# Patient Record
Sex: FEMALE | Race: WHITE | NOT HISPANIC OR LATINO | URBAN - METROPOLITAN AREA
[De-identification: names, ages, dates, MRNs, and addresses within clinical notes are randomized per-mention and may not be internally consistent; named-entity substitution may affect disease eponyms.]

---

## 2023-02-24 LAB
URINE CULTURE: ABNORMAL
URINE CULTURE: ABNORMAL

## 2023-03-01 PROBLEM — I10 HYPERTENSION: Status: ACTIVE | Noted: 2023-03-01

## 2023-03-01 PROBLEM — H16.229 KERATOCONJUNCTIVITIS SICCA NOT DUE TO SJOGREN'S SYNDROME: Status: ACTIVE | Noted: 2023-03-01

## 2023-03-01 PROBLEM — M48.00 SPINAL STENOSIS: Status: ACTIVE | Noted: 2023-03-01

## 2023-03-01 PROBLEM — R53.1 WEAKNESS: Status: ACTIVE | Noted: 2023-03-01

## 2023-03-01 PROBLEM — R32 URINARY INCONTINENCE: Status: ACTIVE | Noted: 2023-03-01

## 2023-03-01 PROBLEM — M79.605 PAIN OF LEFT LOWER EXTREMITY: Status: ACTIVE | Noted: 2023-03-01

## 2023-03-01 PROBLEM — J18.9 PNEUMONIA: Status: ACTIVE | Noted: 2023-03-01

## 2023-03-01 PROBLEM — Z98.890 S/P YAG CAPSULOTOMY, RIGHT: Status: ACTIVE | Noted: 2023-03-01

## 2023-03-01 PROBLEM — R09.81 SINUS CONGESTION: Status: ACTIVE | Noted: 2023-03-01

## 2023-03-01 PROBLEM — D50.9 IRON DEFICIENCY ANEMIA: Status: ACTIVE | Noted: 2023-03-01

## 2023-03-01 PROBLEM — B96.89 ACUTE BACTERIAL SINUSITIS: Status: ACTIVE | Noted: 2023-03-01

## 2023-03-01 PROBLEM — N39.46 MIXED STRESS AND URGE URINARY INCONTINENCE: Status: ACTIVE | Noted: 2023-03-01

## 2023-03-01 PROBLEM — Z98.42 HISTORY OF YAG LASER CAPSULOTOMY OF LENS OF LEFT EYE: Status: ACTIVE | Noted: 2023-03-01

## 2023-03-01 PROBLEM — H52.4 MYOPIA WITH PRESBYOPIA: Status: ACTIVE | Noted: 2023-03-01

## 2023-03-01 PROBLEM — H57.813 BROW PTOSIS, BILATERAL: Status: ACTIVE | Noted: 2023-03-01

## 2023-03-01 PROBLEM — H53.19: Status: ACTIVE | Noted: 2023-03-01

## 2023-03-01 PROBLEM — M54.50 LOWER BACK PAIN: Status: ACTIVE | Noted: 2023-03-01
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
## 2023-03-01 PROBLEM — H26.493 PCO (POSTERIOR CAPSULAR OPACIFICATION), BILATERAL: Status: ACTIVE | Noted: 2023-03-01

## 2023-03-01 PROBLEM — Q67.5 CONGENITAL SCOLIOSIS: Status: ACTIVE | Noted: 2023-03-01

## 2023-03-01 PROBLEM — K58.9 IRRITABLE BOWEL SYNDROME: Status: ACTIVE | Noted: 2023-03-01

## 2023-03-01 PROBLEM — J30.9 ALLERGIC RHINITIS: Status: ACTIVE | Noted: 2023-03-01

## 2023-03-01 PROBLEM — M81.0 OSTEOPOROSIS: Status: ACTIVE | Noted: 2023-03-01

## 2023-03-01 PROBLEM — Z96.1 PSEUDOPHAKIA OF BOTH EYES: Status: ACTIVE | Noted: 2023-03-01

## 2023-03-01 PROBLEM — M25.579 ANKLE PAIN: Status: ACTIVE | Noted: 2023-03-01

## 2023-03-01 PROBLEM — S90.30XA: Status: ACTIVE | Noted: 2023-03-01

## 2023-03-01 PROBLEM — M54.6 THORACIC SPINE PAIN: Status: ACTIVE | Noted: 2023-03-01

## 2023-03-01 PROBLEM — H61.23 BILATERAL IMPACTED CERUMEN: Status: ACTIVE | Noted: 2023-03-01

## 2023-03-01 PROBLEM — M77.8 TENDINITIS OF RIGHT SHOULDER: Status: ACTIVE | Noted: 2023-03-01

## 2023-03-01 PROBLEM — H52.10 MYOPIA WITH PRESBYOPIA: Status: ACTIVE | Noted: 2023-03-01

## 2023-03-01 PROBLEM — H02.839 DERMATOCHALASIS OF EYELID: Status: ACTIVE | Noted: 2023-03-01

## 2023-03-01 PROBLEM — M19.049 ARTHRITIS OF HAND: Status: ACTIVE | Noted: 2023-03-01

## 2023-03-01 PROBLEM — N81.10 FEMALE CYSTOCELE: Status: ACTIVE | Noted: 2023-03-01

## 2023-03-01 PROBLEM — J01.90 ACUTE BACTERIAL SINUSITIS: Status: ACTIVE | Noted: 2023-03-01

## 2023-03-01 PROBLEM — R39.9 URINARY SYMPTOM OR SIGN: Status: ACTIVE | Noted: 2023-03-01

## 2023-03-01 PROBLEM — F41.9 ANXIETY: Status: ACTIVE | Noted: 2023-03-01

## 2023-03-01 PROBLEM — H61.20 CERUMEN IMPACTION: Status: ACTIVE | Noted: 2023-03-01

## 2023-03-01 PROBLEM — E55.9 VITAMIN D DEFICIENCY: Status: ACTIVE | Noted: 2023-03-01

## 2023-03-01 PROBLEM — M51.26 LUMBAR HERNIATED DISC: Status: ACTIVE | Noted: 2023-03-01

## 2023-03-01 PROBLEM — M25.511 RIGHT SHOULDER PAIN: Status: ACTIVE | Noted: 2023-03-01

## 2023-03-01 PROBLEM — N31.2 ATONIC BLADDER: Status: ACTIVE | Noted: 2023-03-01

## 2023-03-01 PROBLEM — H02.402 PTOSIS OF EYELID, LEFT: Status: ACTIVE | Noted: 2023-03-01

## 2023-03-01 PROBLEM — H01.029 SQUAMOUS BLEPHARITIS: Status: ACTIVE | Noted: 2023-03-01

## 2023-03-01 PROBLEM — H02.401 PTOSIS OF RIGHT EYELID: Status: ACTIVE | Noted: 2023-03-01

## 2023-03-01 PROBLEM — D64.9 ANEMIA: Status: ACTIVE | Noted: 2023-03-01

## 2023-03-01 PROBLEM — E78.5 HYPERLIPIDEMIA: Status: ACTIVE | Noted: 2023-03-01

## 2023-03-01 PROBLEM — M25.512 LEFT SHOULDER PAIN: Status: ACTIVE | Noted: 2023-03-01

## 2023-03-01 PROBLEM — S80.12XA CONTUSION OF LEFT LEG: Status: ACTIVE | Noted: 2023-03-01

## 2023-03-01 PROBLEM — H52.209 ASTIGMATISM: Status: ACTIVE | Noted: 2023-03-01

## 2023-03-01 PROBLEM — M41.9 SCOLIOSIS: Status: ACTIVE | Noted: 2023-03-01

## 2023-03-01 PROBLEM — M85.80 OSTEOPENIA: Status: ACTIVE | Noted: 2023-03-01

## 2023-03-01 PROBLEM — R33.9 RETENTION OF URINE: Status: ACTIVE | Noted: 2023-03-01

## 2023-03-01 PROBLEM — H26.9 CATARACT: Status: ACTIVE | Noted: 2023-03-01

## 2023-03-01 PROBLEM — H25.813 COMBINED FORM OF AGE-RELATED CATARACT, BOTH EYES: Status: ACTIVE | Noted: 2023-03-01

## 2023-03-01 RX ORDER — GABAPENTIN 600 MG/1
600 TABLET ORAL 3 TIMES DAILY
COMMUNITY
Start: 2015-01-31 | End: 2023-05-15 | Stop reason: SDUPTHER

## 2023-03-01 RX ORDER — DENOSUMAB 60 MG/ML
INJECTION SUBCUTANEOUS
COMMUNITY
Start: 2019-10-07 | End: 2023-03-23 | Stop reason: SDUPTHER

## 2023-03-01 RX ORDER — NORTRIPTYLINE HYDROCHLORIDE 25 MG/1
2 CAPSULE ORAL EVERY EVENING
COMMUNITY
Start: 2013-07-20 | End: 2023-05-03 | Stop reason: SDUPTHER

## 2023-03-01 RX ORDER — DOCUSATE SODIUM 100 MG/1
1 CAPSULE, LIQUID FILLED ORAL 2 TIMES DAILY PRN
COMMUNITY
End: 2023-05-15 | Stop reason: ALTCHOICE

## 2023-03-01 RX ORDER — POLYETHYLENE GLYCOL 3350 17 G/17G
POWDER, FOR SOLUTION ORAL
COMMUNITY
End: 2023-05-15 | Stop reason: WASHOUT

## 2023-03-01 RX ORDER — PSEUDOEPHEDRINE HCL 120 MG/1
TABLET, FILM COATED, EXTENDED RELEASE ORAL
COMMUNITY
End: 2023-10-02 | Stop reason: ALTCHOICE

## 2023-03-01 RX ORDER — PANTOPRAZOLE SODIUM 40 MG/1
40 TABLET, DELAYED RELEASE ORAL
COMMUNITY
End: 2023-04-05

## 2023-03-01 RX ORDER — LISINOPRIL 10 MG/1
10 TABLET ORAL DAILY
COMMUNITY
End: 2023-04-03 | Stop reason: SDUPTHER

## 2023-03-01 RX ORDER — MINERAL OIL
1 ENEMA (ML) RECTAL DAILY PRN
COMMUNITY
Start: 2022-08-29 | End: 2024-01-08 | Stop reason: ALTCHOICE

## 2023-03-08 ENCOUNTER — OFFICE VISIT (OUTPATIENT)
Dept: PRIMARY CARE | Facility: CLINIC | Age: 78
End: 2023-03-08
Payer: MEDICARE

## 2023-03-08 VITALS
SYSTOLIC BLOOD PRESSURE: 136 MMHG | BODY MASS INDEX: 23.37 KG/M2 | HEART RATE: 67 BPM | DIASTOLIC BLOOD PRESSURE: 70 MMHG | TEMPERATURE: 97 F | OXYGEN SATURATION: 100 % | WEIGHT: 108 LBS

## 2023-03-08 DIAGNOSIS — A41.9 RECURRENT SEPSIS DUE TO URINARY TRACT INFECTION (MULTI): Primary | ICD-10-CM

## 2023-03-08 DIAGNOSIS — I95.9 HYPOTENSION, UNSPECIFIED HYPOTENSION TYPE: ICD-10-CM

## 2023-03-08 DIAGNOSIS — N39.0 RECURRENT SEPSIS DUE TO URINARY TRACT INFECTION (MULTI): Primary | ICD-10-CM

## 2023-03-08 PROCEDURE — 1159F MED LIST DOCD IN RCRD: CPT | Performed by: FAMILY MEDICINE

## 2023-03-08 PROCEDURE — 99214 OFFICE O/P EST MOD 30 MIN: CPT | Performed by: FAMILY MEDICINE

## 2023-03-08 PROCEDURE — 3075F SYST BP GE 130 - 139MM HG: CPT | Performed by: FAMILY MEDICINE

## 2023-03-08 PROCEDURE — 3078F DIAST BP <80 MM HG: CPT | Performed by: FAMILY MEDICINE

## 2023-03-08 ASSESSMENT — ENCOUNTER SYMPTOMS
FATIGUE: 1
DIFFICULTY URINATING: 1

## 2023-03-08 NOTE — PROGRESS NOTES
Subjective   Patient ID: Rahel Cabezas is a 77 y.o. female who presents for Follow-up (2 WEEK FUV).    HPI   She was in [facility] on 03/01/2023 due to sudden feeling unwell. Her legs felt heavy. She went home, sat down and almost passed out. She called the ambulance due to being home alone. She was examined at [facility]. She had an X ray of chest and pelvic. She was admitted ot the hospital due to low blood pressure and sepsis.     She had an UTI on 02/26/2023 and was treated with Macrobid. It did not clear.  She was in the hospital for 4 days.  She was sent home with Cipro 250 mg for 4 days. She was also sent home with Metronidazole 3 times a day for 4 days. She was taken off her blood pressure medication.     She went to Dr Og yesterday. She was told to stay active. She was told to use Miralax if she has constipation. She was told to take Metamucil or fiber daily.  She reports that she has had some loose stool and leakage. She has to change her pants frequently.       Review of Systems   Constitutional:  Positive for fatigue.   Genitourinary:  Positive for difficulty urinating.        Caths twice daily   All other systems reviewed and are negative.      Objective   /70 (BP Location: Right arm, Patient Position: Sitting)   Pulse 67   Temp 36.1 °C (97 °F)   Wt 49 kg (108 lb)   SpO2 100%   BMI 23.37 kg/m²     Physical Exam  Constitutional:       Appearance: Normal appearance.      Comments: Thin appearing   Cardiovascular:      Rate and Rhythm: Normal rate and regular rhythm.      Pulses: Normal pulses.      Heart sounds: Normal heart sounds.   Pulmonary:      Effort: Pulmonary effort is normal.      Breath sounds: Normal breath sounds.   Abdominal:      General: Abdomen is flat. Bowel sounds are normal.      Palpations: Abdomen is soft.   Musculoskeletal:         General: No tenderness. Normal range of motion.      Cervical back: Normal range of motion and neck supple.   Skin:      General: Skin is warm and dry.   Neurological:      General: No focal deficit present.      Mental Status: She is alert and oriented to person, place, and time.   Psychiatric:         Mood and Affect: Mood normal.         Thought Content: Thought content normal.         Judgment: Judgment normal.         Assessment/Plan   Loose Stool  Advised to slowly incorporate Metamucil.  Discussed may be due to antibiotics.     Health maintenance  Will be due for Colonoscopy in 2024    Hospital Follow up  Follow up in 7-10 days to recheck urine and blood pressure.     Scribe Attestation  By signing my name below, IEarline Scribe   attest that this documentation has been prepared under the direction and in the presence of Yu Conley DO.

## 2023-03-22 DIAGNOSIS — M81.0 AGE-RELATED OSTEOPOROSIS WITHOUT CURRENT PATHOLOGICAL FRACTURE: Primary | ICD-10-CM

## 2023-03-23 ENCOUNTER — OFFICE VISIT (OUTPATIENT)
Dept: PRIMARY CARE | Facility: CLINIC | Age: 78
End: 2023-03-23
Payer: MEDICARE

## 2023-03-23 VITALS
TEMPERATURE: 97.7 F | WEIGHT: 110 LBS | DIASTOLIC BLOOD PRESSURE: 87 MMHG | BODY MASS INDEX: 23.8 KG/M2 | OXYGEN SATURATION: 92 % | SYSTOLIC BLOOD PRESSURE: 129 MMHG

## 2023-03-23 DIAGNOSIS — N39.0 RECURRENT SEPSIS DUE TO URINARY TRACT INFECTION (MULTI): Primary | ICD-10-CM

## 2023-03-23 DIAGNOSIS — A41.9 RECURRENT SEPSIS DUE TO URINARY TRACT INFECTION (MULTI): Primary | ICD-10-CM

## 2023-03-23 DIAGNOSIS — M81.0 AGE-RELATED OSTEOPOROSIS WITHOUT CURRENT PATHOLOGICAL FRACTURE: ICD-10-CM

## 2023-03-23 DIAGNOSIS — M48.07 SPINAL STENOSIS OF LUMBOSACRAL REGION: ICD-10-CM

## 2023-03-23 DIAGNOSIS — I10 PRIMARY HYPERTENSION: ICD-10-CM

## 2023-03-23 LAB
POC APPEARANCE, URINE: CLEAR
POC BILIRUBIN, URINE: NEGATIVE
POC BLOOD, URINE: NEGATIVE
POC COLOR, URINE: YELLOW
POC GLUCOSE, URINE: NEGATIVE MG/DL
POC KETONES, URINE: NEGATIVE MG/DL
POC LEUKOCYTES, URINE: NEGATIVE
POC NITRITE,URINE: NEGATIVE
POC PH, URINE: 5.5 PH
POC PROTEIN, URINE: NEGATIVE MG/DL
POC SPECIFIC GRAVITY, URINE: 1.01
POC UROBILINOGEN, URINE: 0.2 EU/DL

## 2023-03-23 PROCEDURE — 99214 OFFICE O/P EST MOD 30 MIN: CPT | Performed by: FAMILY MEDICINE

## 2023-03-23 PROCEDURE — 3074F SYST BP LT 130 MM HG: CPT | Performed by: FAMILY MEDICINE

## 2023-03-23 PROCEDURE — 81001 URINALYSIS AUTO W/SCOPE: CPT

## 2023-03-23 PROCEDURE — 3079F DIAST BP 80-89 MM HG: CPT | Performed by: FAMILY MEDICINE

## 2023-03-23 PROCEDURE — 81003 URINALYSIS AUTO W/O SCOPE: CPT | Performed by: FAMILY MEDICINE

## 2023-03-23 PROCEDURE — 1159F MED LIST DOCD IN RCRD: CPT | Performed by: FAMILY MEDICINE

## 2023-03-23 PROCEDURE — 1160F RVW MEDS BY RX/DR IN RCRD: CPT | Performed by: FAMILY MEDICINE

## 2023-03-23 RX ORDER — DENOSUMAB 60 MG/ML
60 INJECTION SUBCUTANEOUS ONCE
Qty: 1 ML | Refills: 0 | Status: SHIPPED | OUTPATIENT
Start: 2023-03-23 | End: 2023-08-17 | Stop reason: SDUPTHER

## 2023-03-23 RX ORDER — DENOSUMAB 60 MG/ML
60 INJECTION SUBCUTANEOUS ONCE
Qty: 1 ML | Refills: 0 | Status: SHIPPED | OUTPATIENT
Start: 2023-03-23 | End: 2023-03-23 | Stop reason: SDUPTHER

## 2023-03-23 ASSESSMENT — ENCOUNTER SYMPTOMS
PSYCHIATRIC NEGATIVE: 1
CONSTITUTIONAL NEGATIVE: 1
BACK PAIN: 1
RESPIRATORY NEGATIVE: 1
CARDIOVASCULAR NEGATIVE: 1

## 2023-03-23 NOTE — PATIENT INSTRUCTIONS
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  Will send for culture. Will call for results.        Musculoskeletal    Osteoporosis    Relevant Medications    denosumab (Prolia) 60 mg/mL syringe,  Prescription sent to pharmacy.      Circulatory   Hypertension  Advised to check blood pressure again in 2-3 weeks. If it is more elevated, call the office.      Left Leg Pain  Advised to Heat, Stretch, Followed by Ice.   If Pain persists, we can order a steroid dose pack.

## 2023-03-23 NOTE — PROGRESS NOTES
Subjective   Patient ID: Rahel Cabezas is a 77 y.o. female who presents for Follow-up (Here for a follow up).    HPI   She complains of pain in the left buttocks down the thigh, on the side of the leg past the knee. This started last week after bendin over. She has been using heat and either Tylenol or ibuprofen for relief.  The pain is getting better, but staying the better if she uses heat and medication. It hurts when she walks as well at times. She notes the pain is no longer spreading to below the knee.     She has had difficulties getting her prolia filled. It has gone through and will be shipped to office for administration    She went to podiatry. She was treated for an ingrown toenail and fungal infection. She goes every 9 weeks for maintenance.     She is not currently taking lisinopril. She does not have a working blood pressure monitor at home. Bp is reasonable today, will continue observing    No furhter urinary s/s.  Will recheck urine today and culture      Review of Systems   Constitutional: Negative.    Respiratory: Negative.     Cardiovascular: Negative.    Genitourinary: Negative.    Musculoskeletal:  Positive for back pain.        Pain radiating down L leg   Psychiatric/Behavioral: Negative.         Objective   /87   Temp 36.5 °C (97.7 °F)   Wt 49.9 kg (110 lb)   SpO2 92%   BMI 23.80 kg/m²     Physical Exam  Constitutional:       Appearance: Normal appearance.   Cardiovascular:      Rate and Rhythm: Normal rate and regular rhythm.      Pulses: Normal pulses.      Heart sounds: Normal heart sounds.   Pulmonary:      Effort: Pulmonary effort is normal.      Breath sounds: Normal breath sounds.   Abdominal:      General: Bowel sounds are normal.      Palpations: Abdomen is soft.   Musculoskeletal:         General: No tenderness. Normal range of motion.      Cervical back: Normal range of motion and neck supple.      Comments: Pt has normal rom LS spine, she has tenderness L sacral sulcus,  no tenderness over greater trochanter.  + tenderness L buttocks    Dtr's wnl, no weakness or sensory or vasc deficit   Skin:     General: Skin is warm and dry.   Neurological:      General: No focal deficit present.      Mental Status: She is alert and oriented to person, place, and time.   Psychiatric:         Mood and Affect: Mood normal.         Thought Content: Thought content normal.         Judgment: Judgment normal.         Assessment/Plan   Problem List Items Addressed This Visit          Nervous    Spinal stenosis - Primary       Circulatory    Hypertension  Advised to check blood pressure again in 2-3 weeks. If it is more elevated, call the office.        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  Will send for culture. Will call for results.        Musculoskeletal    Osteoporosis    Relevant Medications    denosumab (Prolia) 60 mg/mL syringe,  Prescription sent to pharmacy.      Left Leg Pain  Advised to Heat, Stretch, Followed by Ice.   If Pain persists, we can order a steroid dose pack.     Follow up visit in 2 -3 months.      Scribe Attestation  By signing my name below, IEarline Scribe   attest that this documentation has been prepared under the direction and in the presence of Yu Conley DO.

## 2023-03-24 ENCOUNTER — TELEPHONE (OUTPATIENT)
Dept: PRIMARY CARE | Facility: CLINIC | Age: 78
End: 2023-03-24
Payer: MEDICARE

## 2023-03-24 DIAGNOSIS — M48.07 SPINAL STENOSIS OF LUMBOSACRAL REGION: Primary | ICD-10-CM

## 2023-03-24 LAB
RBC, URINE: 1 /HPF (ref 0–5)
WBC CLUMPS, URINE: ABNORMAL /HPF
WBC, URINE: 7 /HPF (ref 0–5)

## 2023-03-24 RX ORDER — METHYLPREDNISOLONE 4 MG/1
TABLET ORAL
Qty: 21 TABLET | Refills: 0 | Status: SHIPPED | OUTPATIENT
Start: 2023-03-24 | End: 2023-03-31

## 2023-03-24 NOTE — TELEPHONE ENCOUNTER
Rahel called and would like you to fill the pain meds you talked about for her leg yesterday she thought she could wait till Monday but she can not    [facility] [place]    Sent in medrol dose pack as discussed, AB

## 2023-04-03 ENCOUNTER — OFFICE VISIT (OUTPATIENT)
Dept: PRIMARY CARE | Facility: CLINIC | Age: 78
End: 2023-04-03
Payer: MEDICARE

## 2023-04-03 VITALS
TEMPERATURE: 97.7 F | HEART RATE: 80 BPM | OXYGEN SATURATION: 97 % | SYSTOLIC BLOOD PRESSURE: 143 MMHG | DIASTOLIC BLOOD PRESSURE: 83 MMHG

## 2023-04-03 DIAGNOSIS — M81.8 OTHER OSTEOPOROSIS, UNSPECIFIED PATHOLOGICAL FRACTURE PRESENCE: Primary | ICD-10-CM

## 2023-04-03 DIAGNOSIS — N39.0 RECURRENT SEPSIS DUE TO URINARY TRACT INFECTION (MULTI): ICD-10-CM

## 2023-04-03 DIAGNOSIS — I10 PRIMARY HYPERTENSION: ICD-10-CM

## 2023-04-03 DIAGNOSIS — A41.9 RECURRENT SEPSIS DUE TO URINARY TRACT INFECTION (MULTI): ICD-10-CM

## 2023-04-03 PROCEDURE — 3077F SYST BP >= 140 MM HG: CPT | Performed by: FAMILY MEDICINE

## 2023-04-03 PROCEDURE — 3079F DIAST BP 80-89 MM HG: CPT | Performed by: FAMILY MEDICINE

## 2023-04-03 PROCEDURE — 99214 OFFICE O/P EST MOD 30 MIN: CPT | Performed by: FAMILY MEDICINE

## 2023-04-03 PROCEDURE — 1159F MED LIST DOCD IN RCRD: CPT | Performed by: FAMILY MEDICINE

## 2023-04-03 PROCEDURE — 1160F RVW MEDS BY RX/DR IN RCRD: CPT | Performed by: FAMILY MEDICINE

## 2023-04-03 PROCEDURE — 96372 THER/PROPH/DIAG INJ SC/IM: CPT | Performed by: FAMILY MEDICINE

## 2023-04-03 RX ORDER — LISINOPRIL 5 MG/1
5 TABLET ORAL DAILY
Qty: 90 TABLET | Refills: 0 | Status: SHIPPED | OUTPATIENT
Start: 2023-04-03 | End: 2023-05-15 | Stop reason: SDUPTHER

## 2023-04-03 ASSESSMENT — ENCOUNTER SYMPTOMS
LOSS OF SENSATION IN FEET: 0
OCCASIONAL FEELINGS OF UNSTEADINESS: 0
DEPRESSION: 0

## 2023-04-03 NOTE — PATIENT INSTRUCTIONS
Shoulder pain  Advised to call to reschedule physical therapy for the shoulder prior to surgery.     Leg Pain  Patient will call if she is open to physical therapy.     Hypertention  Start  on Lisinopril 5 mg daily. Prescription sent to pharmacy.   Advised to get an electronic blood pressure cuff that fits the upper arm.   If it is consistently over 140/90, call the office for medication adjustment.

## 2023-04-03 NOTE — PROGRESS NOTES
Subjective   Patient ID: Rahel Cabezas is a 77 y.o. female who presents for Follow-up (Here for a fuv).    HPI   Patient originally came in for her Prolia injection which she did receive today.  She also wanted to be seen for her blood pressure and discuss her low back and her leg    She attributes elevated blood pressure to a miscommunication regarding todays visit. She has not been taking her lisinopril.  She wanted to discuss if it should be restarted since pressure is higher.  Will start half dose of the lisinopril 10 mg that she has at home, restarting 5 mg and will recheck.    She fell walking in the hallway at home after spraying for ants.  She hurt her right leg and her gluteal muscle.  She then started the prednisone for back pain.  Her back pain that she had previously and the pain from her fall has been improving with the Medrol Dosepak.  She has been using hot compresses and alternating with ice. She declines physical therapy.  She believes she can exercise on her own.     She canceled physical therapy for her arm, because she thought she was going to have it at home. However, there was a miscommunication.  She is going to reschedule that appointment and start the PT prescribed by her orthopedic doctor.    Review of Systems    Objective   /83   Pulse 80   Temp 36.5 °C (97.7 °F)   SpO2 97%     Physical Exam  Constitutional:       Appearance: Normal appearance.   Cardiovascular:      Rate and Rhythm: Normal rate and regular rhythm.      Pulses: Normal pulses.      Heart sounds: Normal heart sounds.   Pulmonary:      Effort: Pulmonary effort is normal.      Breath sounds: Normal breath sounds.   Musculoskeletal:         General: No tenderness. Normal range of motion.      Cervical back: Normal range of motion and neck supple.      Comments: No tenderness over spine, full rom, has scoliosis.  Good rom of bilat hits, no swelling or bruising of legs   Skin:     General: Skin is warm and dry.    Neurological:      General: No focal deficit present.      Mental Status: She is alert and oriented to person, place, and time.   Psychiatric:         Mood and Affect: Mood normal.         Thought Content: Thought content normal.         Judgment: Judgment normal.         Assessment/Plan   Problem List Items Addressed This Visit          Circulatory    Hypertension - Primary  Start on Lisinopril 5 mg daily. Prescription sent to pharmacy.   Advised to get an electronic blood pressure cuff that fits the upper arm.   If it is consistently over 140/90, call the office for medication adjustment.        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     Leg Pain  Patient will call if she is open to physical therapy. She is doing exercises at home from previous therapyand is much better w this and medrol dose pack    Follow up in 6-8 weeks, unless a visit is needed sooner.     Scribe Attestation  By signing my name below, IEarline Scribe   attest that this documentation has been prepared under the direction and in the presence of Yu Conley DO.

## 2023-05-03 DIAGNOSIS — K58.9 IRRITABLE BOWEL SYNDROME, UNSPECIFIED TYPE: Primary | ICD-10-CM

## 2023-05-03 RX ORDER — NORTRIPTYLINE HYDROCHLORIDE 25 MG/1
50 CAPSULE ORAL EVERY EVENING
Qty: 60 CAPSULE | Refills: 0 | Status: SHIPPED | OUTPATIENT
Start: 2023-05-03 | End: 2023-05-03 | Stop reason: SDUPTHER

## 2023-05-03 RX ORDER — NORTRIPTYLINE HYDROCHLORIDE 25 MG/1
50 CAPSULE ORAL EVERY EVENING
Qty: 180 CAPSULE | Refills: 0 | Status: SHIPPED | OUTPATIENT
Start: 2023-05-03 | End: 2023-08-17 | Stop reason: SDUPTHER

## 2023-05-03 NOTE — TELEPHONE ENCOUNTER
Patient called for refills of Metoprolol 25 mg called into [facility] in [place].  Last OV 4/3/2023, next OV 5/15/2023.

## 2023-05-12 PROBLEM — K21.9 GASTRO-ESOPHAGEAL REFLUX DISEASE WITHOUT ESOPHAGITIS: Status: ACTIVE | Noted: 2022-09-21

## 2023-05-12 PROBLEM — G62.9 POLYNEUROPATHY, UNSPECIFIED: Status: ACTIVE | Noted: 2022-09-21

## 2023-05-12 PROBLEM — A41.9 SEPTIC SHOCK (MULTI): Status: ACTIVE | Noted: 2023-03-01

## 2023-05-12 PROBLEM — F33.9 MAJOR DEPRESSIVE DISORDER, RECURRENT, UNSPECIFIED (CMS-HCC): Status: ACTIVE | Noted: 2022-09-21

## 2023-05-12 PROBLEM — M62.81 MUSCLE WEAKNESS (GENERALIZED): Status: ACTIVE | Noted: 2022-09-21

## 2023-05-12 PROBLEM — K27.9 PUD (PEPTIC ULCER DISEASE): Status: ACTIVE | Noted: 2023-05-12

## 2023-05-12 PROBLEM — R00.0 TACHYCARDIA, UNSPECIFIED: Status: ACTIVE | Noted: 2022-09-21

## 2023-05-12 PROBLEM — Z93.1 GASTROSTOMY STATUS (MULTI): Status: ACTIVE | Noted: 2022-09-21

## 2023-05-12 PROBLEM — K44.9 DIAPHRAGMATIC HERNIA WITHOUT OBSTRUCTION OR GANGRENE: Status: ACTIVE | Noted: 2022-09-21

## 2023-05-12 PROBLEM — Z86.718 PERSONAL HISTORY OF OTHER VENOUS THROMBOSIS AND EMBOLISM: Status: ACTIVE | Noted: 2022-09-21

## 2023-05-12 RX ORDER — FERROUS SULFATE 325(65) MG
65 TABLET ORAL
COMMUNITY

## 2023-05-12 RX ORDER — ACETAMINOPHEN 325 MG/1
325 TABLET ORAL EVERY 4 HOURS PRN
COMMUNITY
Start: 2022-09-20

## 2023-05-12 RX ORDER — AZELASTINE 1 MG/ML
1 SPRAY, METERED NASAL 2 TIMES DAILY
COMMUNITY
Start: 2022-05-26 | End: 2023-08-17 | Stop reason: ALTCHOICE

## 2023-05-12 RX ORDER — KETOCONAZOLE 20 MG/ML
SHAMPOO, SUSPENSION TOPICAL 2 TIMES WEEKLY
COMMUNITY
Start: 2019-07-31 | End: 2023-08-17 | Stop reason: ALTCHOICE

## 2023-05-12 RX ORDER — DICYCLOMINE HYDROCHLORIDE 10 MG/1
10 CAPSULE ORAL 2 TIMES DAILY
COMMUNITY
End: 2024-01-08 | Stop reason: WASHOUT

## 2023-05-12 RX ORDER — METRONIDAZOLE 500 MG/1
500 TABLET ORAL 3 TIMES DAILY
COMMUNITY
Start: 2023-03-05 | End: 2023-05-15 | Stop reason: WASHOUT

## 2023-05-12 RX ORDER — APIXABAN 5 MG/1
5 TABLET, FILM COATED ORAL 2 TIMES DAILY
COMMUNITY
End: 2023-05-15 | Stop reason: ALTCHOICE

## 2023-05-15 ENCOUNTER — OFFICE VISIT (OUTPATIENT)
Dept: PRIMARY CARE | Facility: CLINIC | Age: 78
End: 2023-05-15
Payer: MEDICARE

## 2023-05-15 VITALS
OXYGEN SATURATION: 96 % | SYSTOLIC BLOOD PRESSURE: 140 MMHG | BODY MASS INDEX: 24.02 KG/M2 | WEIGHT: 111 LBS | DIASTOLIC BLOOD PRESSURE: 85 MMHG | HEART RATE: 102 BPM | TEMPERATURE: 97.6 F

## 2023-05-15 DIAGNOSIS — F33.42 RECURRENT MAJOR DEPRESSIVE DISORDER, IN FULL REMISSION (CMS-HCC): ICD-10-CM

## 2023-05-15 DIAGNOSIS — Z00.00 MEDICARE ANNUAL WELLNESS VISIT, SUBSEQUENT: ICD-10-CM

## 2023-05-15 DIAGNOSIS — E78.5 HYPERLIPIDEMIA, UNSPECIFIED HYPERLIPIDEMIA TYPE: ICD-10-CM

## 2023-05-15 DIAGNOSIS — K21.9 GASTRO-ESOPHAGEAL REFLUX DISEASE WITHOUT ESOPHAGITIS: ICD-10-CM

## 2023-05-15 DIAGNOSIS — M81.0 AGE-RELATED OSTEOPOROSIS WITHOUT CURRENT PATHOLOGICAL FRACTURE: ICD-10-CM

## 2023-05-15 DIAGNOSIS — N31.2 ATONIC BLADDER: ICD-10-CM

## 2023-05-15 DIAGNOSIS — G62.9 POLYNEUROPATHY, UNSPECIFIED: ICD-10-CM

## 2023-05-15 DIAGNOSIS — I10 PRIMARY HYPERTENSION: ICD-10-CM

## 2023-05-15 DIAGNOSIS — Z93.1 GASTROSTOMY STATUS (MULTI): ICD-10-CM

## 2023-05-15 DIAGNOSIS — Z78.0 ASYMPTOMATIC MENOPAUSAL STATE: ICD-10-CM

## 2023-05-15 DIAGNOSIS — Z00.00 ROUTINE GENERAL MEDICAL EXAMINATION AT HEALTH CARE FACILITY: Primary | ICD-10-CM

## 2023-05-15 PROCEDURE — 3079F DIAST BP 80-89 MM HG: CPT | Performed by: FAMILY MEDICINE

## 2023-05-15 PROCEDURE — 99214 OFFICE O/P EST MOD 30 MIN: CPT | Performed by: FAMILY MEDICINE

## 2023-05-15 PROCEDURE — 1170F FXNL STATUS ASSESSED: CPT | Performed by: FAMILY MEDICINE

## 2023-05-15 PROCEDURE — 1157F ADVNC CARE PLAN IN RCRD: CPT | Performed by: FAMILY MEDICINE

## 2023-05-15 PROCEDURE — 99397 PER PM REEVAL EST PAT 65+ YR: CPT | Performed by: FAMILY MEDICINE

## 2023-05-15 PROCEDURE — 1160F RVW MEDS BY RX/DR IN RCRD: CPT | Performed by: FAMILY MEDICINE

## 2023-05-15 PROCEDURE — 1036F TOBACCO NON-USER: CPT | Performed by: FAMILY MEDICINE

## 2023-05-15 PROCEDURE — G0439 PPPS, SUBSEQ VISIT: HCPCS | Performed by: FAMILY MEDICINE

## 2023-05-15 PROCEDURE — 1159F MED LIST DOCD IN RCRD: CPT | Performed by: FAMILY MEDICINE

## 2023-05-15 PROCEDURE — 3077F SYST BP >= 140 MM HG: CPT | Performed by: FAMILY MEDICINE

## 2023-05-15 RX ORDER — FERROUS SULFATE 325(65) MG
325 TABLET ORAL
COMMUNITY
End: 2023-05-15 | Stop reason: SDUPTHER

## 2023-05-15 RX ORDER — BIOTIN 1 MG
1 TABLET ORAL
COMMUNITY
End: 2023-05-15 | Stop reason: WASHOUT

## 2023-05-15 RX ORDER — LISINOPRIL 5 MG/1
5 TABLET ORAL DAILY
Qty: 90 TABLET | Refills: 0 | Status: SHIPPED | OUTPATIENT
Start: 2023-05-15 | End: 2023-08-17 | Stop reason: SDUPTHER

## 2023-05-15 RX ORDER — GABAPENTIN 600 MG/1
1 TABLET ORAL 3 TIMES DAILY
COMMUNITY
Start: 2021-12-02 | End: 2023-08-17 | Stop reason: SDUPTHER

## 2023-05-15 RX ORDER — PANTOPRAZOLE SODIUM 40 MG/1
1 TABLET, DELAYED RELEASE ORAL
COMMUNITY
Start: 2023-01-08 | End: 2023-06-26 | Stop reason: SDUPTHER

## 2023-05-15 ASSESSMENT — LIFESTYLE VARIABLES
SKIP TO QUESTIONS 9-10: 1
HOW MANY STANDARD DRINKS CONTAINING ALCOHOL DO YOU HAVE ON A TYPICAL DAY: PATIENT DOES NOT DRINK
HOW OFTEN DO YOU HAVE A DRINK CONTAINING ALCOHOL: NEVER
HOW OFTEN DO YOU HAVE SIX OR MORE DRINKS ON ONE OCCASION: NEVER
AUDIT-C TOTAL SCORE: 0

## 2023-05-15 ASSESSMENT — ACTIVITIES OF DAILY LIVING (ADL)
GROCERY_SHOPPING: INDEPENDENT
MANAGING_FINANCES: INDEPENDENT
DOING_HOUSEWORK: INDEPENDENT
BATHING: INDEPENDENT
TAKING_MEDICATION: INDEPENDENT
DRESSING: INDEPENDENT

## 2023-05-15 ASSESSMENT — ENCOUNTER SYMPTOMS
OCCASIONAL FEELINGS OF UNSTEADINESS: 0
DEPRESSION: 0
LOSS OF SENSATION IN FEET: 0

## 2023-05-15 ASSESSMENT — PATIENT HEALTH QUESTIONNAIRE - PHQ9
SUM OF ALL RESPONSES TO PHQ9 QUESTIONS 1 AND 2: 0
1. LITTLE INTEREST OR PLEASURE IN DOING THINGS: NOT AT ALL
2. FEELING DOWN, DEPRESSED OR HOPELESS: NOT AT ALL

## 2023-05-15 ASSESSMENT — COLUMBIA-SUICIDE SEVERITY RATING SCALE - C-SSRS
2. HAVE YOU ACTUALLY HAD ANY THOUGHTS OF KILLING YOURSELF?: NO
1. IN THE PAST MONTH, HAVE YOU WISHED YOU WERE DEAD OR WISHED YOU COULD GO TO SLEEP AND NOT WAKE UP?: NO
6. HAVE YOU EVER DONE ANYTHING, STARTED TO DO ANYTHING, OR PREPARED TO DO ANYTHING TO END YOUR LIFE?: NO

## 2023-05-15 NOTE — ASSESSMENT & PLAN NOTE
Patient has to cath for urinary retention, watching urine and checking urine dip more frequently for infection.  Stable now

## 2023-05-15 NOTE — PROGRESS NOTES
OARRS:  No data recorded  I have personally reviewed the OARRS report for Rahel Cabezas. I have considered the risks of abuse, dependence, addiction and diversion    Is the patient prescribed a combination of a benzodiazepine and opioid?  No    Last Urine Drug Screen / ordered today: No  No results found for this or any previous visit (from the past 57358 hour(s)).  Results are as expected.     Controlled Substance Agreement:  Date of the Last Agreement: 2/6/2023  Reviewed Controlled Substance Agreement including but not limited to the benefits, risks, and alternatives to treatment with a Controlled Substance medication(s).    Anticonvulsant:   What is the patient's goal of therapy? Help pain  Is this being achieved with current treatment? yes    Activities of Daily Living:   Is your overall impression that this patient is benefiting (symptom reduction outweighs side effects) from Anticonvulsant therapy? Yes     1. Physical Functioning: Better  2. Family Relationship: Better  3. Social Relationship: Better  4. Mood: Better  5. Sleep Patterns: Better  6. Overall Function: Better  Subjective   Reason for Visit: Rahel Cabezas is an 78 y.o. female here for a Medicare Wellness visit.     Past Medical, Surgical, and Family History reviewed and updated in chart.    Reviewed all medications by prescribing practitioner or clinical pharmacist (such as prescriptions, OTCs, herbal therapies and supplements) and documented in the medical record.    HPI  She got a blood pressure monitor and has been recording readings at home. The readings are as follows 125/69, 127/70,125/67, and 127/72.  These were taken last week and over weekend.  She is higher here today but did not bring her cuff w her, she will bring next office visit    Last Dexa was in 2019. Last mammogram was at [facility] in 02/2023.     She is up to date on vaccinations at this time.  She declines need for medication refills at this time.     She notes her seasonal  allergies have been more severe. She takes sudafed for relief.     She tries to drink plenty of water to prevent UTI's.     Patient Care Team:  Yu Conley DO as PCP - General  Yu Conley DO as PCP - [employer] PCP     Review of Systems    Objective   Vitals:  /85   Pulse 102   Temp 36.4 °C (97.6 °F)   Wt 50.3 kg (111 lb)   SpO2 96%   BMI 24.02 kg/m²       Physical Exam  Constitutional:       Appearance: Normal appearance.   Cardiovascular:      Rate and Rhythm: Normal rate and regular rhythm.      Pulses: Normal pulses.      Heart sounds: Normal heart sounds.   Pulmonary:      Effort: Pulmonary effort is normal.      Breath sounds: Normal breath sounds.   Abdominal:      General: Abdomen is flat.      Palpations: Abdomen is soft.   Musculoskeletal:         General: Normal range of motion.      Cervical back: Normal range of motion and neck supple.   Skin:     General: Skin is warm and dry.   Neurological:      General: No focal deficit present.      Mental Status: She is alert and oriented to person, place, and time.   Psychiatric:         Mood and Affect: Mood normal.         Thought Content: Thought content normal.         Judgment: Judgment normal.         Assessment/Plan   Problem List Items Addressed This Visit    None  Health Maintenance  Patient will provide mammogram record.   Provided order for DEXA bone density scan. Will call with results  Patient will provide urine sample for drug screen and contract.     Hypertension  Continue on lisinopril 5 mg daily. Prescription sent to pharmacy.     Follow up in 3 months, unless a visit is needed prior.   Problem List Items Addressed This Visit          Nervous    Polyneuropathy, unspecified       Circulatory    Hypertension    Relevant Medications    lisinopril 5 mg tablet       Digestive    Gastro-esophageal reflux disease without esophagitis       Genitourinary    Atonic bladder       Musculoskeletal     Osteoporosis       Other    Hyperlipidemia    Gastrostomy status (CMS/[facility])    Major depressive disorder, recurrent, unspecified (CMS/[facility])     In remission, doing well on meds          Other Visit Diagnoses       Routine general medical examination at health care facility    -  Primary    Asymptomatic menopausal state        Relevant Orders    XR DEXA bone density           Scribe Attestation  By signing my name below, I, Annabel Hi   attest that this documentation has been prepared under the direction and in the presence of Yu Conley DO.'

## 2023-05-15 NOTE — PATIENT INSTRUCTIONS
Health Maintenance  Patient will provide mammogram record.   Provided order for DEXA bone density scan. Will call with results  Patient will provide urine sample for drug screen and contract.     Hypertension  Continue on lisinopril 5 mg daily. Prescription sent to pharmacy.

## 2023-05-24 ENCOUNTER — TELEPHONE (OUTPATIENT)
Dept: PRIMARY CARE | Facility: CLINIC | Age: 78
End: 2023-05-24
Payer: MEDICARE

## 2023-05-24 NOTE — TELEPHONE ENCOUNTER
PT called in wanting to know when should they bring in their urine specimen for testing she doesn't know if she could bring them in during her appointment or before. She has left voicemail's and she hasn't got a phone call returned.

## 2023-05-25 ENCOUNTER — APPOINTMENT (OUTPATIENT)
Dept: PRIMARY CARE | Facility: CLINIC | Age: 78
End: 2023-05-25
Payer: MEDICARE

## 2023-06-26 DIAGNOSIS — K21.9 GASTRO-ESOPHAGEAL REFLUX DISEASE WITHOUT ESOPHAGITIS: Primary | ICD-10-CM

## 2023-06-26 RX ORDER — PANTOPRAZOLE SODIUM 40 MG/1
40 TABLET, DELAYED RELEASE ORAL
Qty: 180 TABLET | Refills: 1 | Status: SHIPPED | OUTPATIENT
Start: 2023-06-26 | End: 2024-01-08 | Stop reason: SDUPTHER

## 2023-07-11 ENCOUNTER — PATIENT OUTREACH (OUTPATIENT)
Dept: CARE COORDINATION | Facility: CLINIC | Age: 78
End: 2023-07-11
Payer: MEDICARE

## 2023-07-11 NOTE — PROGRESS NOTES
Discharge Facility:[facility]  Discharge Diagnosis:Hypotensive UTI  Admission Date 07/10/23    PCP Appointment Date:  Specialist Appointment Date:   Hospital Encounter and Summary: Linked   See discharge assessment below for further details

## 2023-07-27 ENCOUNTER — APPOINTMENT (OUTPATIENT)
Dept: PRIMARY CARE | Facility: CLINIC | Age: 78
End: 2023-07-27
Payer: MEDICARE

## 2023-08-14 ENCOUNTER — APPOINTMENT (OUTPATIENT)
Dept: PRIMARY CARE | Facility: CLINIC | Age: 78
End: 2023-08-14
Payer: MEDICARE

## 2023-08-17 ENCOUNTER — OFFICE VISIT (OUTPATIENT)
Dept: PRIMARY CARE | Facility: CLINIC | Age: 78
End: 2023-08-17
Payer: MEDICARE

## 2023-08-17 VITALS
OXYGEN SATURATION: 97 % | BODY MASS INDEX: 24.02 KG/M2 | WEIGHT: 111 LBS | SYSTOLIC BLOOD PRESSURE: 126 MMHG | HEART RATE: 105 BPM | DIASTOLIC BLOOD PRESSURE: 74 MMHG

## 2023-08-17 DIAGNOSIS — M81.0 AGE-RELATED OSTEOPOROSIS WITHOUT CURRENT PATHOLOGICAL FRACTURE: ICD-10-CM

## 2023-08-17 DIAGNOSIS — G62.9 POLYNEUROPATHY, UNSPECIFIED: ICD-10-CM

## 2023-08-17 DIAGNOSIS — N31.2 ATONIC BLADDER: Primary | ICD-10-CM

## 2023-08-17 DIAGNOSIS — K58.9 IRRITABLE BOWEL SYNDROME, UNSPECIFIED TYPE: ICD-10-CM

## 2023-08-17 DIAGNOSIS — Z79.899 MEDICATION MANAGEMENT: ICD-10-CM

## 2023-08-17 DIAGNOSIS — I10 PRIMARY HYPERTENSION: ICD-10-CM

## 2023-08-17 LAB
POC APPEARANCE, URINE: CLEAR
POC BILIRUBIN, URINE: NEGATIVE
POC BLOOD, URINE: ABNORMAL
POC COLOR, URINE: YELLOW
POC GLUCOSE, URINE: NEGATIVE MG/DL
POC KETONES, URINE: NEGATIVE MG/DL
POC LEUKOCYTES, URINE: ABNORMAL
POC NITRITE,URINE: NEGATIVE
POC PH, URINE: 5.5 PH
POC PROTEIN, URINE: NEGATIVE MG/DL
POC SPECIFIC GRAVITY, URINE: <=1.005
POC UROBILINOGEN, URINE: 0.2 EU/DL

## 2023-08-17 PROCEDURE — 99214 OFFICE O/P EST MOD 30 MIN: CPT | Performed by: FAMILY MEDICINE

## 2023-08-17 PROCEDURE — 80307 DRUG TEST PRSMV CHEM ANLYZR: CPT

## 2023-08-17 PROCEDURE — 1157F ADVNC CARE PLAN IN RCRD: CPT | Performed by: FAMILY MEDICINE

## 2023-08-17 PROCEDURE — 81003 URINALYSIS AUTO W/O SCOPE: CPT | Performed by: FAMILY MEDICINE

## 2023-08-17 PROCEDURE — 87086 URINE CULTURE/COLONY COUNT: CPT

## 2023-08-17 PROCEDURE — 3078F DIAST BP <80 MM HG: CPT | Performed by: FAMILY MEDICINE

## 2023-08-17 PROCEDURE — 87077 CULTURE AEROBIC IDENTIFY: CPT

## 2023-08-17 PROCEDURE — 87186 SC STD MICRODIL/AGAR DIL: CPT

## 2023-08-17 PROCEDURE — 1160F RVW MEDS BY RX/DR IN RCRD: CPT | Performed by: FAMILY MEDICINE

## 2023-08-17 PROCEDURE — 1036F TOBACCO NON-USER: CPT | Performed by: FAMILY MEDICINE

## 2023-08-17 PROCEDURE — 1126F AMNT PAIN NOTED NONE PRSNT: CPT | Performed by: FAMILY MEDICINE

## 2023-08-17 PROCEDURE — 3074F SYST BP LT 130 MM HG: CPT | Performed by: FAMILY MEDICINE

## 2023-08-17 PROCEDURE — 1159F MED LIST DOCD IN RCRD: CPT | Performed by: FAMILY MEDICINE

## 2023-08-17 RX ORDER — LISINOPRIL 5 MG/1
5 TABLET ORAL DAILY
Qty: 90 TABLET | Refills: 0 | Status: SHIPPED | OUTPATIENT
Start: 2023-08-17 | End: 2023-10-02 | Stop reason: SDUPTHER

## 2023-08-17 RX ORDER — DOCUSATE SODIUM 100 MG/1
1 CAPSULE, LIQUID FILLED ORAL 2 TIMES DAILY PRN
COMMUNITY
Start: 2023-02-20

## 2023-08-17 RX ORDER — VIT C/E/ZN/COPPR/LUTEIN/ZEAXAN 250MG-90MG
1000 CAPSULE ORAL
COMMUNITY

## 2023-08-17 RX ORDER — DENOSUMAB 60 MG/ML
60 INJECTION SUBCUTANEOUS ONCE
Qty: 1 ML | Refills: 0 | Status: SHIPPED | OUTPATIENT
Start: 2023-08-17 | End: 2023-08-17

## 2023-08-17 RX ORDER — GABAPENTIN 600 MG/1
600 TABLET ORAL 3 TIMES DAILY
Qty: 270 TABLET | Refills: 0 | Status: SHIPPED | OUTPATIENT
Start: 2023-08-17 | End: 2024-01-08 | Stop reason: SDUPTHER

## 2023-08-17 RX ORDER — TITANIUM DIOXIDE, OCTINOXATE, ZINC OXIDE 4.61; 1.6; .78 G/40ML; G/40ML; G/40ML
CREAM TOPICAL
COMMUNITY

## 2023-08-17 RX ORDER — NORTRIPTYLINE HYDROCHLORIDE 25 MG/1
50 CAPSULE ORAL EVERY EVENING
Qty: 180 CAPSULE | Refills: 0 | Status: SHIPPED | OUTPATIENT
Start: 2023-08-17 | End: 2024-01-08 | Stop reason: SDUPTHER

## 2023-08-17 ASSESSMENT — ENCOUNTER SYMPTOMS
DEPRESSION: 0
OCCASIONAL FEELINGS OF UNSTEADINESS: 0
LOSS OF SENSATION IN FEET: 0

## 2023-08-17 ASSESSMENT — PATIENT HEALTH QUESTIONNAIRE - PHQ9
2. FEELING DOWN, DEPRESSED OR HOPELESS: NOT AT ALL
SUM OF ALL RESPONSES TO PHQ9 QUESTIONS 1 AND 2: 0
1. LITTLE INTEREST OR PLEASURE IN DOING THINGS: NOT AT ALL

## 2023-08-18 LAB
AMPHETAMINE (PRESENCE) IN URINE BY SCREEN METHOD: NORMAL
BARBITURATES PRESENCE IN URINE BY SCREEN METHOD: NORMAL
BENZODIAZEPINE (PRESENCE) IN URINE BY SCREEN METHOD: NORMAL
CANNABINOIDS IN URINE BY SCREEN METHOD: NORMAL
COCAINE (PRESENCE) IN URINE BY SCREEN METHOD: NORMAL
DRUG SCREEN COMMENT URINE: NORMAL
FENTANYL URINE: NORMAL
OPIATES (PRESENCE) IN URINE BY SCREEN METHOD: NORMAL
OXYCODONE (PRESENCE) IN URINE BY SCREEN METHOD: NORMAL
PHENCYCLIDINE (PRESENCE) IN URINE BY SCREEN METHOD: NORMAL

## 2023-08-19 DIAGNOSIS — K58.9 IRRITABLE BOWEL SYNDROME, UNSPECIFIED TYPE: ICD-10-CM

## 2023-08-19 LAB — URINE CULTURE: ABNORMAL

## 2023-08-23 DIAGNOSIS — N30.00 ACUTE CYSTITIS WITHOUT HEMATURIA: Primary | ICD-10-CM

## 2023-08-23 RX ORDER — NITROFURANTOIN 25; 75 MG/1; MG/1
100 CAPSULE ORAL 2 TIMES DAILY
Qty: 14 CAPSULE | Refills: 0 | Status: SHIPPED | OUTPATIENT
Start: 2023-08-23 | End: 2023-08-30

## 2023-09-05 ENCOUNTER — TELEPHONE (OUTPATIENT)
Dept: PRIMARY CARE | Facility: CLINIC | Age: 78
End: 2023-09-05
Payer: MEDICARE

## 2023-09-05 NOTE — TELEPHONE ENCOUNTER
PATIENT CANCELLED APPT FOR THURS. SHE IS IN THE HOSPITAL FOR A UTI/COLON INFECTION. SHE WILL F/U WITH US ONCE THOSE ISSUES ARE TAKEN CARE OF.

## 2023-09-08 ENCOUNTER — PATIENT OUTREACH (OUTPATIENT)
Dept: CARE COORDINATION | Facility: CLINIC | Age: 78
End: 2023-09-08

## 2023-09-08 ENCOUNTER — APPOINTMENT (OUTPATIENT)
Dept: PRIMARY CARE | Facility: CLINIC | Age: 78
End: 2023-09-08
Payer: MEDICARE

## 2023-09-08 NOTE — PROGRESS NOTES
Discharge Facility:[facility]  Discharge Diagnosis:colitis septic shock  Admission Date:09/02/23  Discharge Date: 09/07/23    PCP Appointment Date:  Specialist Appointment Date:   Hospital Encounter and Summary: Linked   See discharge assessment below for further details

## 2023-09-20 ENCOUNTER — PATIENT OUTREACH (OUTPATIENT)
Dept: CARE COORDINATION | Facility: CLINIC | Age: 78
End: 2023-09-20
Payer: MEDICARE

## 2023-09-20 NOTE — PROGRESS NOTES
Unable to reach patient for call back after patient's follow up appointment with PCP.   LINDYM with call back number for patient to call if needed   If no voicemail available call attempts x 2 were made to contact the patient to assist with any questions or concerns patient may have.

## 2023-10-02 ENCOUNTER — OFFICE VISIT (OUTPATIENT)
Dept: PRIMARY CARE | Facility: CLINIC | Age: 78
End: 2023-10-02
Payer: MEDICARE

## 2023-10-02 VITALS
OXYGEN SATURATION: 98 % | DIASTOLIC BLOOD PRESSURE: 70 MMHG | BODY MASS INDEX: 24.24 KG/M2 | TEMPERATURE: 96 F | SYSTOLIC BLOOD PRESSURE: 116 MMHG | WEIGHT: 112 LBS

## 2023-10-02 DIAGNOSIS — K21.9 GASTRO-ESOPHAGEAL REFLUX DISEASE WITHOUT ESOPHAGITIS: Primary | ICD-10-CM

## 2023-10-02 DIAGNOSIS — N39.0 RECURRENT SEPSIS DUE TO URINARY TRACT INFECTION (MULTI): ICD-10-CM

## 2023-10-02 DIAGNOSIS — R33.9 RETENTION OF URINE: ICD-10-CM

## 2023-10-02 DIAGNOSIS — A41.9 RECURRENT SEPSIS DUE TO URINARY TRACT INFECTION (MULTI): ICD-10-CM

## 2023-10-02 DIAGNOSIS — D64.9 ANEMIA, UNSPECIFIED TYPE: ICD-10-CM

## 2023-10-02 DIAGNOSIS — I10 PRIMARY HYPERTENSION: ICD-10-CM

## 2023-10-02 DIAGNOSIS — N31.2 ATONIC BLADDER: ICD-10-CM

## 2023-10-02 DIAGNOSIS — M81.0 AGE-RELATED OSTEOPOROSIS WITHOUT CURRENT PATHOLOGICAL FRACTURE: ICD-10-CM

## 2023-10-02 DIAGNOSIS — M85.80 OSTEOPENIA, UNSPECIFIED LOCATION: ICD-10-CM

## 2023-10-02 PROBLEM — M25.473 ANKLE SWELLING: Status: ACTIVE | Noted: 2023-10-02

## 2023-10-02 PROBLEM — K55.9 ISCHEMIC COLITIS (MULTI): Status: ACTIVE | Noted: 2023-05-30

## 2023-10-02 PROBLEM — M75.121 NONTRAUMATIC COMPLETE TEAR OF RIGHT ROTATOR CUFF: Status: ACTIVE | Noted: 2023-04-20

## 2023-10-02 PROBLEM — R19.5 GUAIAC + STOOL: Status: ACTIVE | Noted: 2023-07-08

## 2023-10-02 PROBLEM — T83.511A URINARY TRACT INFECTION ASSOCIATED WITH INDWELLING URETHRAL CATHETER (CMS-HCC): Status: ACTIVE | Noted: 2023-07-10

## 2023-10-02 PROCEDURE — 1159F MED LIST DOCD IN RCRD: CPT | Performed by: FAMILY MEDICINE

## 2023-10-02 PROCEDURE — 3074F SYST BP LT 130 MM HG: CPT | Performed by: FAMILY MEDICINE

## 2023-10-02 PROCEDURE — 99214 OFFICE O/P EST MOD 30 MIN: CPT | Performed by: FAMILY MEDICINE

## 2023-10-02 PROCEDURE — 1126F AMNT PAIN NOTED NONE PRSNT: CPT | Performed by: FAMILY MEDICINE

## 2023-10-02 PROCEDURE — 1036F TOBACCO NON-USER: CPT | Performed by: FAMILY MEDICINE

## 2023-10-02 PROCEDURE — 96372 THER/PROPH/DIAG INJ SC/IM: CPT | Performed by: FAMILY MEDICINE

## 2023-10-02 PROCEDURE — 1160F RVW MEDS BY RX/DR IN RCRD: CPT | Performed by: FAMILY MEDICINE

## 2023-10-02 PROCEDURE — 3078F DIAST BP <80 MM HG: CPT | Performed by: FAMILY MEDICINE

## 2023-10-02 RX ORDER — LISINOPRIL 5 MG/1
5 TABLET ORAL DAILY
Qty: 90 TABLET | Refills: 1 | Status: SHIPPED | OUTPATIENT
Start: 2023-10-02 | End: 2024-01-08 | Stop reason: SDUPTHER

## 2023-10-02 RX ORDER — CEPHALEXIN 250 MG/1
250 CAPSULE ORAL
COMMUNITY
Start: 2023-09-18 | End: 2024-01-08 | Stop reason: ALTCHOICE

## 2023-10-02 NOTE — PROGRESS NOTES
Subjective   Patient ID: Rahel Cabezas is a 78 y.o. female who presents for HOSPITAL FUV (Recurrent UTI).    HPI PT WAS at drug store and was sweating, passed out in drug store and squadded to [facility] and then admitted to [facility].  Admitted 9/2 to 9/7.  Saw urologist there and is following her as out pt.  Had cystoscopy when admitted, bladder thickened.  Wants her on Miralax daily, D Mannose, and keflex  250 mg m-w-fr and is scheduled for mri. Dr Howard is urologist working w her.    Had flu vaccine 3 weeks ago.  RSV on Friday  Will get Covid vaccine later.      She is feeling well now, has a lot of Dr timothy and testing scheduled this week.  Urine was rechecked and is clear    Bp good range today, no cp or sob.  No dizziness or light headedness.  Weight stable.      Requesting med refills, needs lisinopril and PPI.  Med list updated        Review of Systems    Objective   /70   Temp 35.6 °C (96 °F)   Wt 50.8 kg (112 lb)   SpO2 98%   BMI 24.24 kg/m²     Physical Exam  Constitutional:       Appearance: Normal appearance.   Neck:      Vascular: No carotid bruit.   Cardiovascular:      Rate and Rhythm: Normal rate and regular rhythm.      Pulses: Normal pulses.      Heart sounds: Normal heart sounds.   Pulmonary:      Effort: Pulmonary effort is normal.      Breath sounds: Normal breath sounds.   Musculoskeletal:         General: Normal range of motion.      Cervical back: Normal range of motion.      Right lower leg: No edema.      Left lower leg: No edema.   Skin:     General: Skin is warm and dry.   Neurological:      Mental Status: She is alert and oriented to person, place, and time.   Psychiatric:         Mood and Affect: Mood normal.         Thought Content: Thought content normal.         Judgment: Judgment normal.         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  Updated med list from hosp records she brought in  Discussed immunizationa at length and answered her questions

## 2023-10-06 ENCOUNTER — PATIENT OUTREACH (OUTPATIENT)
Dept: CARE COORDINATION | Facility: CLINIC | Age: 78
End: 2023-10-06
Payer: MEDICARE

## 2023-10-06 NOTE — PROGRESS NOTES
Unable to reach patient for one month post discharge follow up call.   LVM with call back number for patient to call if needed   If no voicemail available call attempts x 2 were made to contact the patient to assist with any questions or concerns patient may have.    
Yes

## 2023-11-13 ENCOUNTER — NURSING HOME VISIT (OUTPATIENT)
Dept: POST ACUTE CARE | Facility: EXTERNAL LOCATION | Age: 78
End: 2023-11-13
Payer: MEDICARE

## 2023-11-13 DIAGNOSIS — R53.1 WEAKNESS: Primary | ICD-10-CM

## 2023-11-13 DIAGNOSIS — I10 HYPERTENSION, ESSENTIAL: ICD-10-CM

## 2023-11-13 DIAGNOSIS — N39.0 URINARY TRACT INFECTION ASSOCIATED WITH INDWELLING URETHRAL CATHETER, INITIAL ENCOUNTER (CMS-HCC): ICD-10-CM

## 2023-11-13 DIAGNOSIS — T83.511A URINARY TRACT INFECTION ASSOCIATED WITH INDWELLING URETHRAL CATHETER, INITIAL ENCOUNTER (CMS-HCC): ICD-10-CM

## 2023-11-13 DIAGNOSIS — K59.00 CONSTIPATION, UNSPECIFIED CONSTIPATION TYPE: ICD-10-CM

## 2023-11-13 PROCEDURE — 99308 SBSQ NF CARE LOW MDM 20: CPT | Performed by: NURSE PRACTITIONER

## 2023-11-13 NOTE — LETTER
Patient: Rahel Cabezas  : 1945    Encounter Date: 2023    PROGRESS NOTE    Subjective  Chief complaint: Rahel Cabezas is a 78 y.o. female who is an acute skilled patient being seen and evaluated for weakness.    HPI:  HPI  Patient is skilled for therapy due to weakness after recent hospitalization for a UTI.  Patient completed antibiotics in the hospital for UTI therapy to evaluate and treat the patient.  Patient remains with a Dobbins.  Patient was seen and examined at bedside.  Denies nausea, vomiting, fevers, chills.  Patient did complain of hard stools and smear of blood with bowel movement.  Denies abdominal pain.  No acute distress no new concerns reported by nursing.     Objective  Vital signs: 138/63, 70, 97.3    Physical Exam  Constitutional:       Appearance: She is not ill-appearing or diaphoretic.   HENT:      Head: Normocephalic.      Nose: Nose normal. No congestion.      Mouth/Throat:      Mouth: Mucous membranes are moist.   Eyes:      Pupils: Pupils are equal, round, and reactive to light.   Cardiovascular:      Rate and Rhythm: Normal rate.   Pulmonary:      Effort: No respiratory distress.      Breath sounds: No wheezing.   Abdominal:      General: Bowel sounds are normal.   Genitourinary:     Comments: Dobbins, long-term  Musculoskeletal:         General: No deformity.      Cervical back: Normal range of motion.      Comments: Generalized weakness   Skin:     General: Skin is dry.      Findings: No rash.   Neurological:      General: No focal deficit present.      Mental Status: She is alert.         Assessment/Plan  Problem List Items Addressed This Visit       Constipation     Start Senna         Hypertension, essential     Monitor BP  Lisinopril         Urinary tract infection associated with indwelling urethral catheter (CMS/[facility])     Antibiotic complete  Dobbins  D-Mannose           Weakness - Primary     Therapy to evaluate and treat.          Medications, treatments, and labs  reviewed  Continue medications and treatments as listed in EMR      Scribe Attestation  Sonia SUÁREZ Scribe   attest that this documentation has been prepared under the direction and in the presence of WATSON Myers    Provider Attestation - Scribe documentation  All medical record entries made by the Scribe were at my direction and personally dictated by me. I have reviewed the chart and agree that the record accurately reflects my personal performance of the history, physical exam, discussion and plan.   WATSON Myers        Electronically Signed By: WATSON Myers   11/17/23  1:04 PM   5

## 2023-11-14 ENCOUNTER — NURSING HOME VISIT (OUTPATIENT)
Dept: POST ACUTE CARE | Facility: EXTERNAL LOCATION | Age: 78
End: 2023-11-14
Payer: MEDICARE

## 2023-11-14 DIAGNOSIS — I10 HYPERTENSION, ESSENTIAL: ICD-10-CM

## 2023-11-14 DIAGNOSIS — R53.1 WEAKNESS: Primary | ICD-10-CM

## 2023-11-14 DIAGNOSIS — N31.9 NEUROGENIC BLADDER: ICD-10-CM

## 2023-11-14 PROBLEM — K59.00 CONSTIPATION: Status: ACTIVE | Noted: 2023-11-14

## 2023-11-14 PROCEDURE — 99308 SBSQ NF CARE LOW MDM 20: CPT | Performed by: NURSE PRACTITIONER

## 2023-11-14 NOTE — PROGRESS NOTES
PROGRESS NOTE    Subjective   Chief complaint: Rahel Cabezas is a 78 y.o. female who is an acute skilled patient being seen and evaluated for weakness.    HPI:  HPI  Patient is skilled for therapy due to weakness after recent hospitalization for a UTI.  Patient completed antibiotics in the hospital for UTI therapy to evaluate and treat the patient.  Patient remains with a Dobbins.  Patient was seen and examined at bedside.  Denies nausea, vomiting, fevers, chills.  Patient did complain of hard stools and smear of blood with bowel movement.  Denies abdominal pain.  No acute distress no new concerns reported by nursing.     Objective   Vital signs: 138/63, 70, 97.3    Physical Exam  Constitutional:       Appearance: She is not ill-appearing or diaphoretic.   HENT:      Head: Normocephalic.      Nose: Nose normal. No congestion.      Mouth/Throat:      Mouth: Mucous membranes are moist.   Eyes:      Pupils: Pupils are equal, round, and reactive to light.   Cardiovascular:      Rate and Rhythm: Normal rate.   Pulmonary:      Effort: No respiratory distress.      Breath sounds: No wheezing.   Abdominal:      General: Bowel sounds are normal.   Genitourinary:     Comments: Dobbins, long-term  Musculoskeletal:         General: No deformity.      Cervical back: Normal range of motion.      Comments: Generalized weakness   Skin:     General: Skin is dry.      Findings: No rash.   Neurological:      General: No focal deficit present.      Mental Status: She is alert.         Assessment/Plan   Problem List Items Addressed This Visit       Constipation     Start Senna         Hypertension, essential     Monitor BP  Lisinopril         Urinary tract infection associated with indwelling urethral catheter (CMS/[facility])     Antibiotic complete  Dobbins  D-Mannose           Weakness - Primary     Therapy to evaluate and treat.          Medications, treatments, and labs reviewed  Continue medications and treatments as listed in EMR      Scribe  Attestation  I, Annabel Cross   attest that this documentation has been prepared under the direction and in the presence of WATSON Myers    Provider Attestation - Scribe documentation  All medical record entries made by the Scribe were at my direction and personally dictated by me. I have reviewed the chart and agree that the record accurately reflects my personal performance of the history, physical exam, discussion and plan.   WATSON Myers

## 2023-11-14 NOTE — LETTER
Patient: Rahel Cabezas  : 1945    Encounter Date: 2023    PROGRESS NOTE    Subjective  Chief complaint: Rahel Cabezas is a 78 y.o. female who is an acute skilled patient being seen and evaluated for weakness    HPI:  HPI  Patient is skilled working in therapy.  Patient will work with therapy to improve strength, endurance, and ADLs.  Patient was seen and examined at bedside in no apparent distress.  No new concerns today.  Denies n/v/f/c pain.  Nursing reports no further concerns today.    Objective  Vital signs: 98/68, 97.6, 83, 18    Physical Exam  Constitutional:       General: She is not in acute distress.  Eyes:      Extraocular Movements: Extraocular movements intact.   Cardiovascular:      Rate and Rhythm: Normal rate and regular rhythm.   Pulmonary:      Effort: Pulmonary effort is normal.      Breath sounds: Normal breath sounds.   Abdominal:      General: Bowel sounds are normal.      Palpations: Abdomen is soft.   Genitourinary:     Comments: Dobbins catheter  Musculoskeletal:      Cervical back: Neck supple.      Right lower leg: No edema.      Left lower leg: No edema.      Comments: Generalized weakness   Neurological:      Mental Status: She is alert.   Psychiatric:         Mood and Affect: Mood normal.         Behavior: Behavior is cooperative.         Assessment/Plan  Problem List Items Addressed This Visit       Hypertension, essential     Blood pressure at goal  Monitor BP  Lisinopril         Neurogenic bladder     Dobbins catheter, straight caths at home         Weakness - Primary     Work with therapy          Medications, treatments, and labs reviewed  Continue medications and treatments as listed in EMR      Scribe Attestation  ISonia Scribe   attest that this documentation has been prepared under the direction and in the presence of WATSON Myers    Provider Attestation - Scribe documentation  All medical record entries made by the Scribe were at my  direction and personally dictated by me. I have reviewed the chart and agree that the record accurately reflects my personal performance of the history, physical exam, discussion and plan.   WATSON Myers        Electronically Signed By: WATSON Myers   11/18/23 12:28 PM

## 2023-11-15 ENCOUNTER — NURSING HOME VISIT (OUTPATIENT)
Dept: POST ACUTE CARE | Facility: EXTERNAL LOCATION | Age: 78
End: 2023-11-15
Payer: MEDICARE

## 2023-11-15 DIAGNOSIS — R53.1 WEAKNESS: ICD-10-CM

## 2023-11-15 DIAGNOSIS — M62.81 MUSCLE WEAKNESS (GENERALIZED): ICD-10-CM

## 2023-11-15 DIAGNOSIS — I10 HYPERTENSION, ESSENTIAL: ICD-10-CM

## 2023-11-15 DIAGNOSIS — K55.9 ISCHEMIC COLITIS (MULTI): ICD-10-CM

## 2023-11-15 DIAGNOSIS — R19.5 GUAIAC + STOOL: ICD-10-CM
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
## 2023-11-15 PROCEDURE — 99305 1ST NF CARE MODERATE MDM 35: CPT | Performed by: INTERNAL MEDICINE

## 2023-11-15 NOTE — LETTER
Patient: Rahel Cabezas  : 1945    Encounter Date: 11/15/2023    HISTORY & PHYSICAL    Subjective  Chief complaint: Rahel Cabezas is a 78 y.o. female who is a acute skilled care patient being seen and evaluated for multiple medical problems.  Patient presents for weakness.    HPI:  Patient is a 78 year old female with a past medical history of back pain, recurrent UTIs, HTN, and GERD. Patient was admitted to the nursing facility after being in the hospital. Patient presented to the ED after being found unresponsive and covered in feces. Pt was noted to have melena and FOBT positive. Patient was given 1L of fluid and started on antibiotics. Patient was evaluated by PT/OT and discharged to SNF.       Past Medical History:   Diagnosis Date   • Acute embolism and thrombosis of left calf muscular vein (CMS/HCC) 10/24/2022    Acute deep vein thrombosis (DVT) of calf muscle vein of left lower extremity   • Anxiety disorder, unspecified 2017    Anxiety   • Dry eye syndrome of bilateral lacrimal glands     Dry eye syndrome of both eyes   • Other intervertebral disc displacement, lumbar region 2018    Lumbar herniated disc   • Personal history of other diseases of the respiratory system 10/10/2018    History of paranasal sinus congestion   • Unspecified cataract     Cataracts, both eyes       Past Surgical History:   Procedure Laterality Date   • BACK SURGERY  2013    Lower Back Surgery       Family History   Problem Relation Name Age of Onset   • No Known Problems Mother         Social History     Socioeconomic History   • Marital status:      Spouse name: Not on file   • Number of children: Not on file   • Years of education: Not on file   • Highest education level: Not on file   Occupational History   • Not on file   Tobacco Use   • Smoking status: Never   • Smokeless tobacco: Never   Substance and Sexual Activity   • Alcohol use: Never   • Drug use: Never   • Sexual activity: Not on file    Other Topics Concern   • Not on file   Social History Narrative   • Not on file     Social Determinants of Health     Financial Resource Strain: Not on file   Food Insecurity: Not on file   Transportation Needs: Not on file   Physical Activity: Not on file   Stress: Not on file   Social Connections: Not on file   Intimate Partner Violence: Not on file   Housing Stability: Not on file       Vital signs: 138/74, 98, 70, 18, 96%    Objective  Physical Exam  Vitals reviewed.   Constitutional:       Appearance: Normal appearance.   HENT:      Head: Normocephalic and atraumatic.   Cardiovascular:      Rate and Rhythm: Normal rate and regular rhythm.   Pulmonary:      Effort: Pulmonary effort is normal.      Breath sounds: Normal breath sounds.   Abdominal:      General: Bowel sounds are normal.      Palpations: Abdomen is soft.   Musculoskeletal:      Cervical back: Neck supple.   Skin:     General: Skin is warm and dry.   Neurological:      General: No focal deficit present.      Mental Status: She is alert.   Psychiatric:         Mood and Affect: Mood normal.         Behavior: Behavior is cooperative.         Assessment/Plan  Problem List Items Addressed This Visit       Fall, accidental - Primary    Hypertension, essential     Continue proper blood pressure meds as listed         Weakness    Muscle weakness (generalized)     Physical therapy         Guaiac + stool    Ischemic colitis (CMS/HCC)     Hydrate patient continue to monitor          Medications, treatments, and labs reviewed  Continue medications and treatments as listed in [facility]    Scribe Attestation  I, Annabel Ortega   attest that this documentation has been prepared under the direction and in the presence of José Antonio Strong MD.    Provider Attestation - Scribe documentation  All medical record entries made by the Scribe were at my direction and personally dictated by me. I have reviewed the chart and agree that the record accurately reflects my  personal performance of the history, physical exam, discussion and plan.    José Antonio Strong MD          Electronically Signed By: José Antonio Strong MD   11/16/23  5:46 PM

## 2023-11-16 PROBLEM — N30.00 ACUTE CYSTITIS: Status: ACTIVE | Noted: 2023-11-16

## 2023-11-16 PROBLEM — R41.89 UNRESPONSIVE: Status: ACTIVE | Noted: 2023-11-16

## 2023-11-16 NOTE — PROGRESS NOTES
HISTORY & PHYSICAL    Subjective   Chief complaint: Rahel Cabezas is a 78 y.o. female who is a acute skilled care patient being seen and evaluated for multiple medical problems.  Patient presents for weakness.    HPI:  Patient is a 78 year old female with a past medical history of back pain, recurrent UTIs, HTN, and GERD. Patient was admitted to the nursing facility after being in the hospital. Patient presented to the ED after being found unresponsive and covered in feces. Pt was noted to have melena and FOBT positive. Patient was given 1L of fluid and started on antibiotics. Patient was evaluated by PT/OT and discharged to SNF.       Past Medical History:   Diagnosis Date    Acute embolism and thrombosis of left calf muscular vein (CMS/HCC) 10/24/2022    Acute deep vein thrombosis (DVT) of calf muscle vein of left lower extremity    Anxiety disorder, unspecified 12/14/2017    Anxiety    Dry eye syndrome of bilateral lacrimal glands     Dry eye syndrome of both eyes    Other intervertebral disc displacement, lumbar region 12/07/2018    Lumbar herniated disc    Personal history of other diseases of the respiratory system 10/10/2018    History of paranasal sinus congestion    Unspecified cataract     Cataracts, both eyes       Past Surgical History:   Procedure Laterality Date    BACK SURGERY  09/24/2013    Lower Back Surgery       Family History   Problem Relation Name Age of Onset    No Known Problems Mother         Social History     Socioeconomic History    Marital status:      Spouse name: Not on file    Number of children: Not on file    Years of education: Not on file    Highest education level: Not on file   Occupational History    Not on file   Tobacco Use    Smoking status: Never    Smokeless tobacco: Never   Substance and Sexual Activity    Alcohol use: Never    Drug use: Never    Sexual activity: Not on file   Other Topics Concern    Not on file   Social History Narrative    Not on file     Social  Determinants of Health     Financial Resource Strain: Not on file   Food Insecurity: Not on file   Transportation Needs: Not on file   Physical Activity: Not on file   Stress: Not on file   Social Connections: Not on file   Intimate Partner Violence: Not on file   Housing Stability: Not on file       Vital signs: 138/74, 98, 70, 18, 96%    Objective   Physical Exam  Vitals reviewed.   Constitutional:       Appearance: Normal appearance.   HENT:      Head: Normocephalic and atraumatic.   Cardiovascular:      Rate and Rhythm: Normal rate and regular rhythm.   Pulmonary:      Effort: Pulmonary effort is normal.      Breath sounds: Normal breath sounds.   Abdominal:      General: Bowel sounds are normal.      Palpations: Abdomen is soft.   Musculoskeletal:      Cervical back: Neck supple.   Skin:     General: Skin is warm and dry.   Neurological:      General: No focal deficit present.      Mental Status: She is alert.   Psychiatric:         Mood and Affect: Mood normal.         Behavior: Behavior is cooperative.         Assessment/Plan   Problem List Items Addressed This Visit       Fall, accidental - Primary    Hypertension, essential     Continue proper blood pressure meds as listed         Weakness    Muscle weakness (generalized)     Physical therapy         Guaiac + stool    Ischemic colitis (CMS/HCC)     Hydrate patient continue to monitor          Medications, treatments, and labs reviewed  Continue medications and treatments as listed in [facility]    Scribe Attestation  I, Frances Ortegaibsteven   attest that this documentation has been prepared under the direction and in the presence of José Antonio Strong MD.    Provider Attestation - Scribe documentation  All medical record entries made by the Scribe were at my direction and personally dictated by me. I have reviewed the chart and agree that the record accurately reflects my personal performance of the history, physical exam, discussion and plan.    José Antonio Strong,  MD

## 2023-11-16 NOTE — PROGRESS NOTES
PROGRESS NOTE    Subjective   Chief complaint: Rahel Cabezas is a 78 y.o. female who is an acute skilled patient being seen and evaluated for weakness    HPI:  HPI  Patient is skilled working in therapy.  Patient will work with therapy to improve strength, endurance, and ADLs.  Patient was seen and examined at bedside in no apparent distress.  No new concerns today.  Denies n/v/f/c pain.  Nursing reports no further concerns today.    Objective   Vital signs: 98/68, 97.6, 83, 18    Physical Exam  Constitutional:       General: She is not in acute distress.  Eyes:      Extraocular Movements: Extraocular movements intact.   Cardiovascular:      Rate and Rhythm: Normal rate and regular rhythm.   Pulmonary:      Effort: Pulmonary effort is normal.      Breath sounds: Normal breath sounds.   Abdominal:      General: Bowel sounds are normal.      Palpations: Abdomen is soft.   Genitourinary:     Comments: Dobbins catheter  Musculoskeletal:      Cervical back: Neck supple.      Right lower leg: No edema.      Left lower leg: No edema.      Comments: Generalized weakness   Neurological:      Mental Status: She is alert.   Psychiatric:         Mood and Affect: Mood normal.         Behavior: Behavior is cooperative.         Assessment/Plan   Problem List Items Addressed This Visit       Hypertension, essential     Blood pressure at goal  Monitor BP  Lisinopril         Neurogenic bladder     Dobbins catheter, straight caths at home         Weakness - Primary     Work with therapy          Medications, treatments, and labs reviewed  Continue medications and treatments as listed in EMR      Scribe Attestation  Sonia SUÁREZ Scribe   attest that this documentation has been prepared under the direction and in the presence of MEGA Myers-CNP    Provider Attestation - Scribe documentation  All medical record entries made by the Scribe were at my direction and personally dictated by me. I have reviewed the chart and  agree that the record accurately reflects my personal performance of the history, physical exam, discussion and plan.   Donna Bynum, APRN-CNP

## 2023-11-17 ENCOUNTER — APPOINTMENT (OUTPATIENT)
Dept: PRIMARY CARE | Facility: CLINIC | Age: 78
End: 2023-11-17
Payer: MEDICARE

## 2023-11-17 ENCOUNTER — NURSING HOME VISIT (OUTPATIENT)
Dept: POST ACUTE CARE | Facility: EXTERNAL LOCATION | Age: 78
End: 2023-11-17

## 2023-11-17 DIAGNOSIS — K59.00 CONSTIPATION, UNSPECIFIED CONSTIPATION TYPE: ICD-10-CM

## 2023-11-17 DIAGNOSIS — F33.9 RECURRENT MAJOR DEPRESSIVE DISORDER, REMISSION STATUS UNSPECIFIED (CMS-HCC): ICD-10-CM

## 2023-11-17 DIAGNOSIS — I10 HYPERTENSION, ESSENTIAL: ICD-10-CM

## 2023-11-17 DIAGNOSIS — R53.1 WEAKNESS: Primary | ICD-10-CM

## 2023-11-17 PROBLEM — N30.00 ACUTE CYSTITIS: Status: RESOLVED | Noted: 2023-11-16 | Resolved: 2023-11-17

## 2023-11-17 PROBLEM — D64.9 ANEMIA: Status: RESOLVED | Noted: 2023-03-01 | Resolved: 2023-11-17

## 2023-11-17 PROBLEM — J01.90 ACUTE BACTERIAL SINUSITIS: Status: RESOLVED | Noted: 2023-03-01 | Resolved: 2023-11-17

## 2023-11-17 PROBLEM — N39.46 MIXED STRESS AND URGE URINARY INCONTINENCE: Status: RESOLVED | Noted: 2023-03-01 | Resolved: 2023-11-17

## 2023-11-17 PROBLEM — N39.0: Status: RESOLVED | Noted: 2023-03-08 | Resolved: 2023-11-17

## 2023-11-17 PROBLEM — M25.512 LEFT SHOULDER PAIN: Status: RESOLVED | Noted: 2023-03-01 | Resolved: 2023-11-17

## 2023-11-17 PROBLEM — Z98.42 HISTORY OF YAG LASER CAPSULOTOMY OF LENS OF LEFT EYE: Status: RESOLVED | Noted: 2023-03-01 | Resolved: 2023-11-17

## 2023-11-17 PROBLEM — M85.80 OSTEOPENIA: Status: RESOLVED | Noted: 2023-03-01 | Resolved: 2023-11-17

## 2023-11-17 PROBLEM — R32 URINARY INCONTINENCE: Status: RESOLVED | Noted: 2023-03-01 | Resolved: 2023-11-17

## 2023-11-17 PROBLEM — M51.26 LUMBAR HERNIATED DISC: Status: RESOLVED | Noted: 2023-03-01 | Resolved: 2023-11-17

## 2023-11-17 PROBLEM — M62.81 MUSCLE WEAKNESS (GENERALIZED): Status: RESOLVED | Noted: 2022-09-21 | Resolved: 2023-11-17

## 2023-11-17 PROBLEM — J18.9 PNEUMONIA: Status: RESOLVED | Noted: 2023-03-01 | Resolved: 2023-11-17

## 2023-11-17 PROBLEM — R00.0 TACHYCARDIA, UNSPECIFIED: Status: RESOLVED | Noted: 2022-09-21 | Resolved: 2023-11-17

## 2023-11-17 PROBLEM — M77.8 TENDINITIS OF RIGHT SHOULDER: Status: RESOLVED | Noted: 2023-03-01 | Resolved: 2023-11-17

## 2023-11-17 PROBLEM — A41.9 SEPTIC SHOCK (MULTI): Status: RESOLVED | Noted: 2023-03-01 | Resolved: 2023-11-17

## 2023-11-17 PROBLEM — R09.81 SINUS CONGESTION: Status: RESOLVED | Noted: 2023-03-01 | Resolved: 2023-11-17

## 2023-11-17 PROBLEM — E55.9 VITAMIN D DEFICIENCY: Status: RESOLVED | Noted: 2023-03-01 | Resolved: 2023-11-17

## 2023-11-17 PROBLEM — S80.12XA CONTUSION OF LEFT LEG: Status: RESOLVED | Noted: 2023-03-01 | Resolved: 2023-11-17

## 2023-11-17 PROBLEM — Z98.890 S/P YAG CAPSULOTOMY, RIGHT: Status: RESOLVED | Noted: 2023-03-01 | Resolved: 2023-11-17

## 2023-11-17 PROBLEM — K55.9 ISCHEMIC COLITIS (MULTI): Status: RESOLVED | Noted: 2023-05-30 | Resolved: 2023-11-17

## 2023-11-17 PROBLEM — M54.50 LOWER BACK PAIN: Status: RESOLVED | Noted: 2023-03-01 | Resolved: 2023-11-17

## 2023-11-17 PROBLEM — M25.473 ANKLE SWELLING: Status: RESOLVED | Noted: 2023-10-02 | Resolved: 2023-11-17

## 2023-11-17 PROBLEM — H61.23 BILATERAL IMPACTED CERUMEN: Status: RESOLVED | Noted: 2023-03-01 | Resolved: 2023-11-17

## 2023-11-17 PROBLEM — M79.605 PAIN OF LEFT LOWER EXTREMITY: Status: RESOLVED | Noted: 2023-03-01 | Resolved: 2023-11-17

## 2023-11-17 PROBLEM — R19.5 GUAIAC + STOOL: Status: RESOLVED | Noted: 2023-07-08 | Resolved: 2023-11-17

## 2023-11-17 PROBLEM — H61.20 CERUMEN IMPACTION: Status: RESOLVED | Noted: 2023-03-01 | Resolved: 2023-11-17

## 2023-11-17 PROBLEM — M54.6 THORACIC SPINE PAIN: Status: RESOLVED | Noted: 2023-03-01 | Resolved: 2023-11-17

## 2023-11-17 PROBLEM — M75.121 NONTRAUMATIC COMPLETE TEAR OF RIGHT ROTATOR CUFF: Status: RESOLVED | Noted: 2023-04-20 | Resolved: 2023-11-17

## 2023-11-17 PROBLEM — H26.9 CATARACT: Status: RESOLVED | Noted: 2023-03-01 | Resolved: 2023-11-17

## 2023-11-17 PROBLEM — R39.9 URINARY SYMPTOM OR SIGN: Status: RESOLVED | Noted: 2023-03-01 | Resolved: 2023-11-17

## 2023-11-17 PROBLEM — R65.21 SEPTIC SHOCK (MULTI): Status: RESOLVED | Noted: 2023-03-01 | Resolved: 2023-11-17

## 2023-11-17 PROBLEM — M25.511 RIGHT SHOULDER PAIN: Status: RESOLVED | Noted: 2023-03-01 | Resolved: 2023-11-17

## 2023-11-17 PROBLEM — R41.89 UNRESPONSIVE: Status: RESOLVED | Noted: 2023-11-16 | Resolved: 2023-11-17

## 2023-11-17 PROBLEM — B96.89 ACUTE BACTERIAL SINUSITIS: Status: RESOLVED | Noted: 2023-03-01 | Resolved: 2023-11-17
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
## 2023-11-17 PROBLEM — H53.19: Status: RESOLVED | Noted: 2023-03-01 | Resolved: 2023-11-17

## 2023-11-17 PROBLEM — A41.9: Status: RESOLVED | Noted: 2023-03-08 | Resolved: 2023-11-17

## 2023-11-17 PROBLEM — M41.9 SCOLIOSIS: Status: RESOLVED | Noted: 2023-03-01 | Resolved: 2023-11-17

## 2023-11-17 PROBLEM — I95.9 HYPOTENSION: Status: RESOLVED | Noted: 2023-03-08 | Resolved: 2023-11-17

## 2023-11-17 PROBLEM — S90.30XA: Status: RESOLVED | Noted: 2023-03-01 | Resolved: 2023-11-17

## 2023-11-17 PROBLEM — M25.579 ANKLE PAIN: Status: RESOLVED | Noted: 2023-03-01 | Resolved: 2023-11-17

## 2023-11-17 PROCEDURE — 99308 SBSQ NF CARE LOW MDM 20: CPT | Performed by: INTERNAL MEDICINE

## 2023-11-17 RX ORDER — NORTRIPTYLINE HYDROCHLORIDE 25 MG/1
50 CAPSULE ORAL EVERY EVENING
Qty: 180 CAPSULE | Refills: 0 | OUTPATIENT
Start: 2023-11-17

## 2023-11-17 NOTE — PROGRESS NOTES
PROGRESS NOTE    Subjective   Chief complaint: Rahel Cabezas is a 78 y.o. female who is an acute skilled patient being seen and evaluated for weakness and medical follow-up.    HPI:  HPI  Patient is skilled for therapy due to weakness after recent hospitalization.  Patient presents for follow-up from recent episode of rectal bleeding, small amount and hard stool, started on senna and use of Triad paste.  Patient has no more rectal bleeding.  Improved, stable.  Patient seen and examined at bedside.  Denies chest pain or shortness of breath.  Denies abdominal pain or nausea.  Mentation at baseline.    Objective   Vital signs: 121/68, 98.0, 65, 18    Physical Exam  Constitutional:       Appearance: She is not ill-appearing or diaphoretic.   HENT:      Head: Normocephalic.      Nose: Nose normal. No congestion.      Mouth/Throat:      Mouth: Mucous membranes are moist.   Eyes:      Pupils: Pupils are equal, round, and reactive to light.   Cardiovascular:      Rate and Rhythm: Normal rate.   Pulmonary:      Effort: No respiratory distress.      Breath sounds: No wheezing.   Abdominal:      General: Bowel sounds are normal.   Genitourinary:     Comments: Dobbins, long-term  Musculoskeletal:         General: No deformity.      Cervical back: Normal range of motion.      Comments: Generalized weakness   Skin:     General: Skin is dry.      Findings: No rash.   Neurological:      General: No focal deficit present.      Mental Status: She is alert.         Assessment/Plan   Problem List Items Addressed This Visit          Cardiac and Vasculature    Hypertension, essential     Monitor BP  Lisinopril            Gastrointestinal and Abdominal    Constipation     Improved  Monitor            Mental Health    Major depressive disorder, recurrent, unspecified (CMS/HCC)     Monitor  Pamelor            Symptoms and Signs    Weakness - Primary     Therapy, work towards goals           Medications, treatments, and labs reviewed  Continue  medications and treatments as listed in EMR      Scribe Attestation  I, Annabel Cross   attest that this documentation has been prepared under the direction and in the presence of José Antonio Strong MD    Provider Attestation - Scribe documentation  All medical record entries made by the Scribe were at my direction and personally dictated by me. I have reviewed the chart and agree that the record accurately reflects my personal performance of the history, physical exam, discussion and plan.   José Antonio Strong MD

## 2023-11-17 NOTE — LETTER
Patient: Rahel Cabezas  : 1945    Encounter Date: 2023    PROGRESS NOTE    Subjective  Chief complaint: Rahel Cabezas is a 78 y.o. female who is an acute skilled patient being seen and evaluated for weakness and medical follow-up.    HPI:  HPI  Patient is skilled for therapy due to weakness after recent hospitalization.  Patient presents for follow-up from recent episode of rectal bleeding, small amount and hard stool, started on senna and use of Triad paste.  Patient has no more rectal bleeding.  Improved, stable.  Patient seen and examined at bedside.  Denies chest pain or shortness of breath.  Denies abdominal pain or nausea.  Mentation at baseline.    Objective  Vital signs: 121/68, 98.0, 65, 18    Physical Exam  Constitutional:       Appearance: She is not ill-appearing or diaphoretic.   HENT:      Head: Normocephalic.      Nose: Nose normal. No congestion.      Mouth/Throat:      Mouth: Mucous membranes are moist.   Eyes:      Pupils: Pupils are equal, round, and reactive to light.   Cardiovascular:      Rate and Rhythm: Normal rate.   Pulmonary:      Effort: No respiratory distress.      Breath sounds: No wheezing.   Abdominal:      General: Bowel sounds are normal.   Genitourinary:     Comments: Dobbins, long-term  Musculoskeletal:         General: No deformity.      Cervical back: Normal range of motion.      Comments: Generalized weakness   Skin:     General: Skin is dry.      Findings: No rash.   Neurological:      General: No focal deficit present.      Mental Status: She is alert.         Assessment/Plan  Problem List Items Addressed This Visit          Cardiac and Vasculature    Hypertension, essential     Monitor BP  Lisinopril            Gastrointestinal and Abdominal    Constipation     Improved  Monitor            Mental Health    Major depressive disorder, recurrent, unspecified (CMS/HCC)     Monitor  Pamelor            Symptoms and Signs    Weakness - Primary     Therapy, work towards  goals           Medications, treatments, and labs reviewed  Continue medications and treatments as listed in EMR      Scribe Attestation  I, Annabel Cross   attest that this documentation has been prepared under the direction and in the presence of José Antonio Strong MD    Provider Attestation - Scribe documentation  All medical record entries made by the Scribe were at my direction and personally dictated by me. I have reviewed the chart and agree that the record accurately reflects my personal performance of the history, physical exam, discussion and plan.   José Antonio Strong MD        Electronically Signed By: José Antonio Strong MD   11/17/23  3:29 PM

## 2023-11-18 PROBLEM — N31.9 NEUROGENIC BLADDER: Status: ACTIVE | Noted: 2023-11-18

## 2023-11-18 PROBLEM — T83.511A URINARY TRACT INFECTION ASSOCIATED WITH INDWELLING URETHRAL CATHETER (CMS-HCC): Status: RESOLVED | Noted: 2023-07-10 | Resolved: 2023-11-18

## 2023-11-18 PROBLEM — N39.0 URINARY TRACT INFECTION ASSOCIATED WITH INDWELLING URETHRAL CATHETER (CMS-HCC): Status: RESOLVED | Noted: 2023-07-10 | Resolved: 2023-11-18

## 2023-11-20 ENCOUNTER — APPOINTMENT (OUTPATIENT)
Dept: PRIMARY CARE | Facility: CLINIC | Age: 78
End: 2023-11-20
Payer: MEDICARE

## 2023-11-20 ENCOUNTER — NURSING HOME VISIT (OUTPATIENT)
Dept: POST ACUTE CARE | Facility: EXTERNAL LOCATION | Age: 78
End: 2023-11-20

## 2023-11-20 DIAGNOSIS — N31.9 NEUROGENIC BLADDER: ICD-10-CM

## 2023-11-20 DIAGNOSIS — R53.1 WEAKNESS: Primary | ICD-10-CM

## 2023-11-20 DIAGNOSIS — I10 HYPERTENSION, ESSENTIAL: ICD-10-CM

## 2023-11-20 PROCEDURE — 99308 SBSQ NF CARE LOW MDM 20: CPT | Performed by: NURSE PRACTITIONER

## 2023-11-20 NOTE — LETTER
Patient: Rahel Cabezas  : 1945    Encounter Date: 2023    PROGRESS NOTE    Subjective  Chief complaint: Rahel Cabezas is a 78 y.o. female who is an acute skilled patient being seen and evaluated for weakness    HPI:  HPI  Patient continues to work with PT and OT.  Patient requires assistance with ADLs. Therapy has been working with the patient to improve strength and endurance with ADLs, transfers, and mobility.  Patient was seen and examined at bedside.  Patient is stable and has no new complaints.  Mood is stable. Nursing staff voices no new concerns today.    Objective  Vital signs: 92/53, 97.9, 66, 18    Physical Exam  Constitutional:       General: She is not in acute distress.  Eyes:      Extraocular Movements: Extraocular movements intact.   Cardiovascular:      Rate and Rhythm: Normal rate and regular rhythm.   Pulmonary:      Effort: Pulmonary effort is normal.      Breath sounds: Normal breath sounds.   Genitourinary:     Comments: Dobbins catheter  Musculoskeletal:      Cervical back: Neck supple.      Right lower leg: No edema.      Left lower leg: No edema.      Comments: Generalized weakness   Neurological:      Mental Status: She is alert.   Psychiatric:         Mood and Affect: Mood normal.         Behavior: Behavior is cooperative.         Assessment/Plan  Problem List Items Addressed This Visit       Hypertension, essential     Controlled  Monitor BP  Lisinopril         Neurogenic bladder     Dobbins catheter         Weakness - Primary     Therapy, work towards goals           Medications, treatments, and labs reviewed  Continue medications and treatments as listed in EMR      Scribe Attestation  Sonia SUÁREZ Scribe   attest that this documentation has been prepared under the direction and in the presence of MEGA Myers-CNP    Provider Attestation - Scribe documentation  All medical record entries made by the Scribe were at my direction and personally dictated by me. I  have reviewed the chart and agree that the record accurately reflects my personal performance of the history, physical exam, discussion and plan.   WATSON Myers        Electronically Signed By: WATSON Myers   11/25/23  5:13 PM

## 2023-11-21 ENCOUNTER — NURSING HOME VISIT (OUTPATIENT)
Dept: POST ACUTE CARE | Facility: EXTERNAL LOCATION | Age: 78
End: 2023-11-21
Payer: MEDICARE

## 2023-11-21 DIAGNOSIS — R53.1 WEAKNESS: Primary | ICD-10-CM

## 2023-11-21 DIAGNOSIS — I10 HYPERTENSION, ESSENTIAL: ICD-10-CM

## 2023-11-21 PROCEDURE — 99308 SBSQ NF CARE LOW MDM 20: CPT | Performed by: NURSE PRACTITIONER

## 2023-11-21 NOTE — PROGRESS NOTES
PROGRESS NOTE    Subjective   Chief complaint: Rahel Cabezas is a 78 y.o. female who is an acute skilled patient being seen and evaluated for weakness    HPI:  HPI  Patient continues to work with PT and OT.  Patient requires assistance with ADLs. Therapy has been working with the patient to improve strength and endurance with ADLs, transfers, and mobility.  Patient was seen and examined at bedside.  Patient is stable and has no new complaints.  Mood is stable. Nursing staff voices no new concerns today.    Objective   Vital signs: 92/53, 97.9, 66, 18    Physical Exam  Constitutional:       General: She is not in acute distress.  Eyes:      Extraocular Movements: Extraocular movements intact.   Cardiovascular:      Rate and Rhythm: Normal rate and regular rhythm.   Pulmonary:      Effort: Pulmonary effort is normal.      Breath sounds: Normal breath sounds.   Genitourinary:     Comments: Dobbins catheter  Musculoskeletal:      Cervical back: Neck supple.      Right lower leg: No edema.      Left lower leg: No edema.      Comments: Generalized weakness   Neurological:      Mental Status: She is alert.   Psychiatric:         Mood and Affect: Mood normal.         Behavior: Behavior is cooperative.         Assessment/Plan   Problem List Items Addressed This Visit       Hypertension, essential     Controlled  Monitor BP  Lisinopril         Neurogenic bladder     Dobbins catheter         Weakness - Primary     Therapy, work towards goals           Medications, treatments, and labs reviewed  Continue medications and treatments as listed in EMR      Scribe Attestation  ISonia Scribe   attest that this documentation has been prepared under the direction and in the presence of MEGA Myers-CNP    Provider Attestation - Scribe documentation  All medical record entries made by the Scribe were at my direction and personally dictated by me. I have reviewed the chart and agree that the record accurately reflects  my personal performance of the history, physical exam, discussion and plan.   Donna Bynum, APRN-CNP

## 2023-11-21 NOTE — LETTER
Patient: Rahel Cabezas  : 1945    Encounter Date: 2023    PROGRESS NOTE    Subjective  Chief complaint: Rahel Cabezas is a 78 y.o. female who is an acute skilled patient being seen and evaluated for weakness    HPI:  HPI  Patient is working with therapy, progressing towards goals.  Patient requires SBA for bed mobility and transfers.  Patient is walking 200 to 350 feet with wheeled walker and SBA.  Speech therapy  working with patient for cognition.  Patient was seen and examined at bedside in no apparent distress.  Mood is stable. Denies nausea or vomiting.  Denies shortness of breath or chest pain.    Objective  Vital signs: 120/64, 98.2, 77, 18    Physical Exam  Constitutional:       General: She is not in acute distress.  Cardiovascular:      Rate and Rhythm: Normal rate and regular rhythm.   Pulmonary:      Effort: Pulmonary effort is normal.      Breath sounds: Normal breath sounds.   Genitourinary:     Comments: Dobbins catheter  Musculoskeletal:      Cervical back: Neck supple.      Right lower leg: No edema.      Left lower leg: No edema.      Comments: Generalized weakness   Neurological:      Mental Status: She is alert.   Psychiatric:         Behavior: Behavior is cooperative.         Assessment/Plan  Problem List Items Addressed This Visit       Hypertension, essential     BP at goal  Monitor BP  Lisinopril         Weakness - Primary     Continue working with therapy, actively participating          Medications, treatments, and labs reviewed  Continue medications and treatments as listed in EMR      Scribe Attestation  Sonia SUÁREZ Scribe   attest that this documentation has been prepared under the direction and in the presence of MEGA Myers-CNP    Provider Attestation - Scribe documentation  All medical record entries made by the Scribe were at my direction and personally dictated by me. I have reviewed the chart and agree that the record accurately reflects my personal  performance of the history, physical exam, discussion and plan.   WATSON Myers        Electronically Signed By: WATSON Myers   11/25/23  5:43 PM

## 2023-11-21 NOTE — PROGRESS NOTES
PROGRESS NOTE    Subjective   Chief complaint: Rahel Cabezas is a 78 y.o. female who is an acute skilled patient being seen and evaluated for weakness    HPI:  HPI  Patient is working with therapy, progressing towards goals.  Patient requires SBA for bed mobility and transfers.  Patient is walking 200 to 350 feet with wheeled walker and SBA.  Speech therapy  working with patient for cognition.  Patient was seen and examined at bedside in no apparent distress.  Mood is stable. Denies nausea or vomiting.  Denies shortness of breath or chest pain.    Objective   Vital signs: 120/64, 98.2, 77, 18    Physical Exam  Constitutional:       General: She is not in acute distress.  Cardiovascular:      Rate and Rhythm: Normal rate and regular rhythm.   Pulmonary:      Effort: Pulmonary effort is normal.      Breath sounds: Normal breath sounds.   Genitourinary:     Comments: Dobbins catheter  Musculoskeletal:      Cervical back: Neck supple.      Right lower leg: No edema.      Left lower leg: No edema.      Comments: Generalized weakness   Neurological:      Mental Status: She is alert.   Psychiatric:         Behavior: Behavior is cooperative.         Assessment/Plan   Problem List Items Addressed This Visit       Hypertension, essential     BP at goal  Monitor BP  Lisinopril         Weakness - Primary     Continue working with therapy, actively participating          Medications, treatments, and labs reviewed  Continue medications and treatments as listed in EMR      Scribe Attestation  Sonia SUÁREZ Scribe   attest that this documentation has been prepared under the direction and in the presence of MEGA Myers-CNP    Provider Attestation - Scribe documentation  All medical record entries made by the Scribe were at my direction and personally dictated by me. I have reviewed the chart and agree that the record accurately reflects my personal performance of the history, physical exam, discussion and plan.    Donna Bynum, APRN-CNP

## 2023-11-22 ENCOUNTER — NURSING HOME VISIT (OUTPATIENT)
Dept: POST ACUTE CARE | Facility: EXTERNAL LOCATION | Age: 78
End: 2023-11-22
Payer: MEDICARE

## 2023-11-22 DIAGNOSIS — I10 HYPERTENSION, ESSENTIAL: ICD-10-CM

## 2023-11-22 DIAGNOSIS — R53.1 WEAKNESS: Primary | ICD-10-CM

## 2023-11-22 DIAGNOSIS — F33.9 RECURRENT MAJOR DEPRESSIVE DISORDER, REMISSION STATUS UNSPECIFIED (CMS-HCC): ICD-10-CM

## 2023-11-22 PROCEDURE — 99308 SBSQ NF CARE LOW MDM 20: CPT | Performed by: INTERNAL MEDICINE

## 2023-11-22 NOTE — PROGRESS NOTES
PROGRESS NOTE    Subjective   Chief complaint: Rahel Cabezas is a 78 y.o. female who is an acute skilled patient being seen and evaluated for weakness    HPI:  HPI  Patient continues working with PT OT due to weakness and debility.  Patient has made improvements with therapy.  Patient requires SBA with transfers and bed mobility.  Patient is ambulating 200 to 350 feet with wheeled walker and SBA.  Patient is planning to discharge home later this week or next.  Order was placed for wheeled walker for patient for discharge.  Patient was seen and examined at bedside.  Patient reports no new concerns.  Denies nausea, vomiting, fever, chills.  Nursing reports no new concerns at this time.    Objective   Vital signs: 120/64, 98.2, 77, 18    Physical Exam  Constitutional:       General: She is not in acute distress.  Eyes:      Extraocular Movements: Extraocular movements intact.   Cardiovascular:      Rate and Rhythm: Normal rate and regular rhythm.   Pulmonary:      Effort: Pulmonary effort is normal.      Breath sounds: Normal breath sounds.   Abdominal:      General: Bowel sounds are normal.      Palpations: Abdomen is soft.   Genitourinary:     Comments: Dobbins catheter  Musculoskeletal:      Cervical back: Neck supple.      Right lower leg: No edema.      Left lower leg: No edema.      Comments: Generalized weakness   Neurological:      Mental Status: She is alert.   Psychiatric:         Mood and Affect: Mood normal.         Behavior: Behavior is cooperative.         Assessment/Plan   Problem List Items Addressed This Visit          Cardiac and Vasculature    Hypertension, essential     Monitor BP  Lisinopril            Mental Health    Major depressive disorder, recurrent, unspecified (CMS/HCC)     Stable  Monitor  Pamelor            Symptoms and Signs    Weakness - Primary     Continue working with therapy, actively participating, making improvements          Medications, treatments, and labs reviewed  Continue  medications and treatments as listed in EMR      Scribe Attestation  I, Annabel Cross   attest that this documentation has been prepared under the direction and in the presence of José Antonio Strong MD    Provider Attestation - Scribe documentation  All medical record entries made by the Scribe were at my direction and personally dictated by me. I have reviewed the chart and agree that the record accurately reflects my personal performance of the history, physical exam, discussion and plan.   José Antonio Strong MD

## 2023-11-22 NOTE — LETTER
Patient: Rahel Cabezas  : 1945    Encounter Date: 2023    PROGRESS NOTE    Subjective  Chief complaint: Rahel Cabezas is a 78 y.o. female who is an acute skilled patient being seen and evaluated for weakness    HPI:  HPI  Patient continues working with PT OT due to weakness and debility.  Patient has made improvements with therapy.  Patient requires SBA with transfers and bed mobility.  Patient is ambulating 200 to 350 feet with wheeled walker and SBA.  Patient is planning to discharge home later this week or next.  Order was placed for wheeled walker for patient for discharge.  Patient was seen and examined at bedside.  Patient reports no new concerns.  Denies nausea, vomiting, fever, chills.  Nursing reports no new concerns at this time.    Objective  Vital signs: 120/64, 98.2, 77, 18    Physical Exam  Constitutional:       General: She is not in acute distress.  Eyes:      Extraocular Movements: Extraocular movements intact.   Cardiovascular:      Rate and Rhythm: Normal rate and regular rhythm.   Pulmonary:      Effort: Pulmonary effort is normal.      Breath sounds: Normal breath sounds.   Abdominal:      General: Bowel sounds are normal.      Palpations: Abdomen is soft.   Genitourinary:     Comments: Dobbins catheter  Musculoskeletal:      Cervical back: Neck supple.      Right lower leg: No edema.      Left lower leg: No edema.      Comments: Generalized weakness   Neurological:      Mental Status: She is alert.   Psychiatric:         Mood and Affect: Mood normal.         Behavior: Behavior is cooperative.         Assessment/Plan  Problem List Items Addressed This Visit          Cardiac and Vasculature    Hypertension, essential     Monitor BP  Lisinopril            Mental Health    Major depressive disorder, recurrent, unspecified (CMS/HCC)     Stable  Monitor  Pamelor            Symptoms and Signs    Weakness - Primary     Continue working with therapy, actively participating, making  improvements          Medications, treatments, and labs reviewed  Continue medications and treatments as listed in EMR      Scribe Attestation  I, Annabel Cross   attest that this documentation has been prepared under the direction and in the presence of José Antonio Strong MD    Provider Attestation - Scribe documentation  All medical record entries made by the Scribe were at my direction and personally dictated by me. I have reviewed the chart and agree that the record accurately reflects my personal performance of the history, physical exam, discussion and plan.   José Antonio Strong MD        Electronically Signed By: José Antonio Strong MD   11/22/23  5:19 PM

## 2023-11-24 ENCOUNTER — NURSING HOME VISIT (OUTPATIENT)
Dept: POST ACUTE CARE | Facility: EXTERNAL LOCATION | Age: 78
End: 2023-11-24
Payer: MEDICARE

## 2023-11-24 DIAGNOSIS — R53.1 WEAKNESS: Primary | ICD-10-CM

## 2023-11-24 DIAGNOSIS — I10 HYPERTENSION, ESSENTIAL: ICD-10-CM

## 2023-11-24 PROCEDURE — 99315 NF DSCHRG MGMT 30 MIN/LESS: CPT | Performed by: INTERNAL MEDICINE

## 2023-11-24 NOTE — LETTER
Patient: Rahel Cabezas  : 1945    Encounter Date: 2023    PROGRESS NOTE    Subjective  Chief complaint: Rahel Cabezas is a 78 y.o. female who is an acute skilled patient being seen and evaluated for weakness, dc home    HPI:  Patient has been working in therapy, She requires moderate assistance for bed mobility, min a for transfers and is walking up to 50' with walker and CGA. Denies chest pain or headache. Denies constitutional symptoms. All questions have been answered, She will dc home today    DC time spent >30 min of which >50% was spent counseling or coordinating care  Course - therapy and medical care  Plan - DC home  Prognosis- fair    Objective  Vital signs: 122/66, 97%    Physical Exam  Constitutional:       General: She is not in acute distress.  Eyes:      Extraocular Movements: Extraocular movements intact.   Cardiovascular:      Rate and Rhythm: Normal rate and regular rhythm.   Pulmonary:      Effort: Pulmonary effort is normal.      Breath sounds: Normal breath sounds.   Abdominal:      General: Bowel sounds are normal.      Palpations: Abdomen is soft.   Musculoskeletal:      Cervical back: Neck supple.      Right lower leg: No edema.      Left lower leg: No edema.   Neurological:      Mental Status: She is alert.   Psychiatric:         Mood and Affect: Mood normal.         Behavior: Behavior is cooperative.         Assessment/Plan  Problem List Items Addressed This Visit       Hypertension, essential     Monitor BP  Lisinopril         Weakness - Primary     DC from therapy  Dc home          Medications, treatments, and labs reviewed  Continue medications and treatments as listed in PCC    Scribe Attestation  By signing my name below, Renetta SUÁREZ Scribe   attest that this documentation has been prepared under the direction and in the presence of José Antonio Strong MD.    Provider Attestation - Scribe documentation  All medical record entries made by the Scribe were at my direction  and personally dictated by me. I have reviewed the chart and agree that the record accurately reflects my personal performance of the history, physical exam, discussion and plan.  1. Weakness        2. Hypertension, essential            Electronically Signed By: José Antonio Strong MD   11/26/23  9:24 AM

## 2023-11-25 PROBLEM — J30.9 ALLERGIC RHINITIS: Status: RESOLVED | Noted: 2023-03-01 | Resolved: 2023-11-25

## 2023-11-25 NOTE — PROGRESS NOTES
PROGRESS NOTE    Subjective   Chief complaint: Rahel Cabezas is a 78 y.o. female who is an acute skilled patient being seen and evaluated for weakness, dc home    HPI:  Patient has been working in therapy, She requires moderate assistance for bed mobility, min a for transfers and is walking up to 50' with walker and CGA. Denies chest pain or headache. Denies constitutional symptoms. All questions have been answered, She will dc home today    DC time spent >30 min of which >50% was spent counseling or coordinating care  Course - therapy and medical care  Plan - DC home  Prognosis- fair    Objective   Vital signs: 122/66, 97%    Physical Exam  Constitutional:       General: She is not in acute distress.  Eyes:      Extraocular Movements: Extraocular movements intact.   Cardiovascular:      Rate and Rhythm: Normal rate and regular rhythm.   Pulmonary:      Effort: Pulmonary effort is normal.      Breath sounds: Normal breath sounds.   Abdominal:      General: Bowel sounds are normal.      Palpations: Abdomen is soft.   Musculoskeletal:      Cervical back: Neck supple.      Right lower leg: No edema.      Left lower leg: No edema.   Neurological:      Mental Status: She is alert.   Psychiatric:         Mood and Affect: Mood normal.         Behavior: Behavior is cooperative.         Assessment/Plan   Problem List Items Addressed This Visit       Hypertension, essential     Monitor BP  Lisinopril         Weakness - Primary     DC from therapy  Dc home          Medications, treatments, and labs reviewed  Continue medications and treatments as listed in PCC    Scribe Attestation  By signing my name below, IRenetta Scribe   attest that this documentation has been prepared under the direction and in the presence of José Antonio Strong MD.    Provider Attestation - Scribe documentation  All medical record entries made by the Scribe were at my direction and personally dictated by me. I have reviewed the chart and agree  that the record accurately reflects my personal performance of the history, physical exam, discussion and plan.  1. Weakness        2. Hypertension, essential

## 2023-11-27 ENCOUNTER — PATIENT OUTREACH (OUTPATIENT)
Dept: CARE COORDINATION | Facility: CLINIC | Age: 78
End: 2023-11-27
Payer: MEDICARE

## 2023-11-27 NOTE — PROGRESS NOTES
Discharge Facility:[facility]  Discharge Diagnosis:Sepsis UTI   Admission Date:11/24/23    PCP Appointment Date:none made sent to pcp office  Specialist Appointment Date:   Hospital Encounter and Summary: not available at this time   See discharge assessment below for further details    2 attempts

## 2023-12-07 ENCOUNTER — PATIENT OUTREACH (OUTPATIENT)
Dept: CARE COORDINATION | Facility: CLINIC | Age: 78
End: 2023-12-07
Payer: MEDICARE

## 2023-12-08 ENCOUNTER — TELEPHONE (OUTPATIENT)
Dept: PRIMARY CARE | Facility: CLINIC | Age: 78
End: 2023-12-08
Payer: MEDICARE

## 2023-12-08 NOTE — TELEPHONE ENCOUNTER
Patient left a message she has been in and out of the hospital and she needs to get in to see you when can I add her in before the end of the year.

## 2023-12-13 ENCOUNTER — APPOINTMENT (OUTPATIENT)
Dept: PRIMARY CARE | Facility: CLINIC | Age: 78
End: 2023-12-13
Payer: MEDICARE

## 2023-12-22 ENCOUNTER — PATIENT OUTREACH (OUTPATIENT)
Dept: CARE COORDINATION | Facility: CLINIC | Age: 78
End: 2023-12-22
Payer: MEDICARE

## 2023-12-29 DIAGNOSIS — K58.9 IRRITABLE BOWEL SYNDROME, UNSPECIFIED TYPE: ICD-10-CM

## 2024-01-03 RX ORDER — NORTRIPTYLINE HYDROCHLORIDE 25 MG/1
50 CAPSULE ORAL EVERY EVENING
Qty: 180 CAPSULE | Refills: 0 | OUTPATIENT
Start: 2024-01-03 | End: 2024-04-02

## 2024-01-08 ENCOUNTER — OFFICE VISIT (OUTPATIENT)
Dept: PRIMARY CARE | Facility: CLINIC | Age: 79
End: 2024-01-08
Payer: MEDICARE

## 2024-01-08 VITALS
TEMPERATURE: 97.5 F | WEIGHT: 115 LBS | DIASTOLIC BLOOD PRESSURE: 82 MMHG | OXYGEN SATURATION: 97 % | BODY MASS INDEX: 23.18 KG/M2 | HEART RATE: 110 BPM | HEIGHT: 59 IN | RESPIRATION RATE: 20 BRPM | SYSTOLIC BLOOD PRESSURE: 133 MMHG

## 2024-01-08 DIAGNOSIS — N31.2 ATONIC BLADDER: ICD-10-CM

## 2024-01-08 DIAGNOSIS — I10 PRIMARY HYPERTENSION: ICD-10-CM

## 2024-01-08 DIAGNOSIS — M81.0 AGE-RELATED OSTEOPOROSIS WITHOUT CURRENT PATHOLOGICAL FRACTURE: ICD-10-CM

## 2024-01-08 DIAGNOSIS — F33.42 RECURRENT MAJOR DEPRESSIVE DISORDER, IN FULL REMISSION (CMS-HCC): ICD-10-CM

## 2024-01-08 DIAGNOSIS — Z12.31 BREAST CANCER SCREENING BY MAMMOGRAM: ICD-10-CM

## 2024-01-08 DIAGNOSIS — K21.9 GASTRO-ESOPHAGEAL REFLUX DISEASE WITHOUT ESOPHAGITIS: ICD-10-CM

## 2024-01-08 DIAGNOSIS — R33.9 RETENTION OF URINE: ICD-10-CM

## 2024-01-08 DIAGNOSIS — J30.9 ALLERGIC RHINITIS, UNSPECIFIED SEASONALITY, UNSPECIFIED TRIGGER: ICD-10-CM

## 2024-01-08 DIAGNOSIS — K58.9 IRRITABLE BOWEL SYNDROME, UNSPECIFIED TYPE: ICD-10-CM

## 2024-01-08 DIAGNOSIS — G62.9 POLYNEUROPATHY, UNSPECIFIED: Primary | ICD-10-CM

## 2024-01-08 PROCEDURE — 3079F DIAST BP 80-89 MM HG: CPT | Performed by: FAMILY MEDICINE

## 2024-01-08 PROCEDURE — 3075F SYST BP GE 130 - 139MM HG: CPT | Performed by: FAMILY MEDICINE

## 2024-01-08 PROCEDURE — 1126F AMNT PAIN NOTED NONE PRSNT: CPT | Performed by: FAMILY MEDICINE

## 2024-01-08 PROCEDURE — 99214 OFFICE O/P EST MOD 30 MIN: CPT | Performed by: FAMILY MEDICINE

## 2024-01-08 PROCEDURE — 1159F MED LIST DOCD IN RCRD: CPT | Performed by: FAMILY MEDICINE

## 2024-01-08 PROCEDURE — 1036F TOBACCO NON-USER: CPT | Performed by: FAMILY MEDICINE

## 2024-01-08 RX ORDER — CETIRIZINE HYDROCHLORIDE 10 MG/1
10 TABLET ORAL DAILY
Start: 2024-01-08

## 2024-01-08 RX ORDER — GABAPENTIN 600 MG/1
600 TABLET ORAL 3 TIMES DAILY
Qty: 270 TABLET | Refills: 0 | Status: SHIPPED | OUTPATIENT
Start: 2024-01-08 | End: 2024-04-08

## 2024-01-08 RX ORDER — NORTRIPTYLINE HYDROCHLORIDE 25 MG/1
50 CAPSULE ORAL EVERY EVENING
Qty: 180 CAPSULE | Refills: 0 | Status: SHIPPED | OUTPATIENT
Start: 2024-01-08 | End: 2024-04-03 | Stop reason: SDUPTHER

## 2024-01-08 RX ORDER — PANTOPRAZOLE SODIUM 40 MG/1
40 TABLET, DELAYED RELEASE ORAL
Qty: 180 TABLET | Refills: 1 | Status: SHIPPED | OUTPATIENT
Start: 2024-01-08 | End: 2024-03-11 | Stop reason: SDUPTHER

## 2024-01-08 RX ORDER — HYDROXYZINE PAMOATE 25 MG/1
50 CAPSULE ORAL NIGHTLY
COMMUNITY
Start: 2023-11-24 | End: 2024-01-17 | Stop reason: ALTCHOICE

## 2024-01-08 RX ORDER — LISINOPRIL 5 MG/1
5 TABLET ORAL DAILY
Qty: 90 TABLET | Refills: 1 | Status: SHIPPED | OUTPATIENT
Start: 2024-01-08 | End: 2024-07-06

## 2024-01-08 ASSESSMENT — PAIN SCALES - GENERAL: PAINLEVEL: 0-NO PAIN

## 2024-01-08 NOTE — PROGRESS NOTES
Subjective   Patient ID: Rahel Cabezas is a 78 y.o. female who presents for hospital discharge.    HPI   The patient presents to the clinic following hospital discharge/rehab and ED discharge. She has past medical history of hypertension, hyperlipidemia, osteoporosis, IBS, frequent UTIs, atonic bladder, anemia, anxiety, major depressive disorder, and spinal stenosis.    She has been compliant with medication and denies any side-effects to current medication.    The patient was admitted into the hospital on 11/11/2023 after she was found unresponsive by her family. She was diagnosed with UTI and sent for rehab to treat weakness. She was successfully discharged from rehab on 11/24/2023.    The patient visited the ED on 12/05/2023 with concerns of a urinary tract infection. She was treated with Bactrim medication and her infection had resolved. She was continued on Keflex at that point. The patient then visited the ED again on 12/21/2023 with concerns of burning with urination.She was diagnosed with another UTI and was started on Bactrim again at the time. Notably, the patient uses self-catheterization several times per day due to retained urine and past 1-2 years only has had frequent uti. The patient continues to consult with her urologist due to frequent UTIs and is scheduled to visit her urologist tomorrow (01/09/2024).  Possible procedure for bladder stimulator to help this problem    The patient's most recent colonoscopy procedure was done in January 2019. She is due for another colonoscopy screening. She will have me refer her when she is done with bladder procedure    Her most recent mammogram was done in February 2023. Ordered today    Her bp has been controlled.  She has not had light headedness or dizziness.  She checks at home often and not low.  Denies cp or sob    Continues on gabapentin, denies side effects and helps neuropathy greatly    Review of Systems    Objective   /82   Pulse 110   Temp  "36.4 °C (97.5 °F)   Resp 20   Ht 1.499 m (4' 11\")   Wt 52.2 kg (115 lb)   SpO2 97%   BMI 23.23 kg/m²     Physical Exam  Constitutional:       Appearance: Normal appearance.   Neck:      Vascular: No carotid bruit.   Cardiovascular:      Rate and Rhythm: Normal rate and regular rhythm.      Pulses: Normal pulses.      Heart sounds: Normal heart sounds.   Pulmonary:      Effort: Pulmonary effort is normal.      Breath sounds: Normal breath sounds.   Abdominal:      General: Bowel sounds are normal.      Palpations: Abdomen is soft. There is no mass.      Tenderness: There is no abdominal tenderness.   Musculoskeletal:         General: Normal range of motion.      Cervical back: Normal range of motion.      Right lower leg: No edema.      Left lower leg: No edema.   Skin:     General: Skin is warm and dry.   Neurological:      Mental Status: She is alert and oriented to person, place, and time.   Psychiatric:         Mood and Affect: Mood normal.         Behavior: Behavior normal.         Thought Content: Thought content normal.         Judgment: Judgment normal.         Assessment/Plan   Problem List Items Addressed This Visit             ICD-10-CM    Atonic bladder N31.2    Hypertension, essential I10    Relevant Medications    lisinopril 5 mg tablet    Irritable bowel syndrome K58.9    Relevant Medications    nortriptyline (Pamelor) 25 mg capsule    Osteoporosis M81.0    Retention of urine R33.9    Gastro-esophageal reflux disease without esophagitis K21.9    Relevant Medications    pantoprazole (ProtoNix) 40 mg EC tablet    Major depressive disorder, recurrent, unspecified (CMS/HCC) F33.9     Pt denies depressive symptoms, mood is good.  Revaluate 6-12 months         Polyneuropathy, unspecified G62.9    Relevant Medications    gabapentin (Neurontin) 600 mg tablet     Other Visit Diagnoses         Codes    Breast cancer screening by mammogram    -  Primary Z12.31    Relevant Orders    BI mammo bilateral " screening tomosynthesis    Primary hypertension     I10    Relevant Medications    lisinopril 5 mg tablet    Allergic rhinitis, unspecified seasonality, unspecified trigger     J30.9    Relevant Medications    cetirizine (ZyrTEC) 10 mg tablet               The patient received refills for current medication (gabapentin 600 mg, lisinopril 5 mg, nortriptyline 25 mg, pantoprazole 40 mg). She was instructed to continue taking medication as previously directed.    A bilateral mammogram screening was ordered for the patient. The clinic will call the patient upon receiving her mammogram results.    In regards to concerns with frequent UTIs, the patient will be further evaluated following consultation with her urologist. Did not recheck urine today, she states urologist usually does this.  If not she will return and give urine specimen    She will follow-up at the end of January 2024 for forms and exam to enter [facility] living facility, unless otherwise needed.    Scribe Attestation  By signing my name below, IVernon Scribe   attest that this documentation has been prepared under the direction and in the presence of Yu Conley DO.

## 2024-01-17 ENCOUNTER — OFFICE VISIT (OUTPATIENT)
Dept: PRIMARY CARE | Facility: CLINIC | Age: 79
End: 2024-01-17
Payer: MEDICARE

## 2024-01-17 VITALS
TEMPERATURE: 98.4 F | WEIGHT: 115 LBS | BODY MASS INDEX: 23.23 KG/M2 | DIASTOLIC BLOOD PRESSURE: 62 MMHG | OXYGEN SATURATION: 97 % | HEART RATE: 76 BPM | SYSTOLIC BLOOD PRESSURE: 118 MMHG

## 2024-01-17 DIAGNOSIS — N31.9 NEUROGENIC BLADDER: ICD-10-CM

## 2024-01-17 DIAGNOSIS — M81.0 AGE-RELATED OSTEOPOROSIS WITHOUT CURRENT PATHOLOGICAL FRACTURE: ICD-10-CM

## 2024-01-17 DIAGNOSIS — Z00.00 HEALTHCARE MAINTENANCE: Primary | ICD-10-CM

## 2024-01-17 DIAGNOSIS — Z93.1 GASTROSTOMY STATUS (MULTI): ICD-10-CM

## 2024-01-17 DIAGNOSIS — M48.07 SPINAL STENOSIS OF LUMBOSACRAL REGION: ICD-10-CM

## 2024-01-17 DIAGNOSIS — F41.9 ANXIETY: ICD-10-CM

## 2024-01-17 DIAGNOSIS — G62.9 POLYNEUROPATHY, UNSPECIFIED: ICD-10-CM

## 2024-01-17 PROCEDURE — 1126F AMNT PAIN NOTED NONE PRSNT: CPT | Performed by: FAMILY MEDICINE

## 2024-01-17 PROCEDURE — 1159F MED LIST DOCD IN RCRD: CPT | Performed by: FAMILY MEDICINE

## 2024-01-17 PROCEDURE — 1160F RVW MEDS BY RX/DR IN RCRD: CPT | Performed by: FAMILY MEDICINE

## 2024-01-17 PROCEDURE — 1036F TOBACCO NON-USER: CPT | Performed by: FAMILY MEDICINE

## 2024-01-17 PROCEDURE — 3074F SYST BP LT 130 MM HG: CPT | Performed by: FAMILY MEDICINE

## 2024-01-17 PROCEDURE — 3078F DIAST BP <80 MM HG: CPT | Performed by: FAMILY MEDICINE

## 2024-01-17 PROCEDURE — 99214 OFFICE O/P EST MOD 30 MIN: CPT | Performed by: FAMILY MEDICINE

## 2024-01-17 RX ORDER — LACTOBACILLUS RHAMNOSUS GG 10B CELL
1 CAPSULE ORAL DAILY
Qty: 30 CAPSULE | Refills: 0
Start: 2024-01-17

## 2024-01-17 RX ORDER — SULFAMETHOXAZOLE AND TRIMETHOPRIM 800; 160 MG/1; MG/1
1 TABLET ORAL 2 TIMES DAILY
COMMUNITY
Start: 2024-01-11 | End: 2024-01-17 | Stop reason: ALTCHOICE

## 2024-01-17 NOTE — PROGRESS NOTES
Subjective   Patient ID: Rahel Cabezas is a 78 y.o. female who presents for Forms/questionnaires.    HPI   The patient presents to the clinic to fill required forms/paperwork as she enters an assisted living facility. She has past medical history of hypertension, osteoporosis, IBS, anxiety, major depressive disorder, spinal stenosis, and polyneuropathy.    She has been compliant with medication and denies any side-effects to current medication.    She will yossi her Prolia in the spring for osteoporosis, and is asking about this today    Wants med refills today also    Bp good range, no cp or sob and no orthostasis    She continues on Bactrim -160 mg 2 times daily to control her symptoms as she contracted a UTI recently. She states that her symptoms have mostly resolved and she is almost finished with her current round of antibiotic medication. She continues to use  self-catheterization several times per day due to retained urine. She has had frequent UTIs over the past 1-2 years.  She is scheduled for surgery for bladder stimulator and she is hoping this helps w her frequent uti and may no longer need to self cath    She is looking forward to entering the assisted living facility for the social aspect.  Her daughter is here today w pt.    Review of Systems    Objective   /62   Pulse 76   Temp 36.9 °C (98.4 °F)   Wt 52.2 kg (115 lb)   SpO2 97%   BMI 23.23 kg/m²     Physical Exam  Neck:      Vascular: No carotid bruit.   Cardiovascular:      Rate and Rhythm: Normal rate and regular rhythm.      Pulses: Normal pulses.      Heart sounds: Normal heart sounds.   Pulmonary:      Effort: Pulmonary effort is normal.      Breath sounds: Normal breath sounds.   Abdominal:      General: Bowel sounds are normal.      Palpations: Abdomen is soft. There is no mass.      Tenderness: There is no abdominal tenderness.   Musculoskeletal:         General: Normal range of motion.      Cervical back: Normal range of  motion.      Right lower leg: No edema.      Left lower leg: No edema.   Lymphadenopathy:      Cervical: No cervical adenopathy.   Skin:     General: Skin is warm and dry.   Neurological:      Mental Status: She is alert and oriented to person, place, and time.      Gait: Gait abnormal.   Psychiatric:         Mood and Affect: Mood normal.         Behavior: Behavior normal.         Thought Content: Thought content normal.         Judgment: Judgment normal.         Assessment/Plan   Problem List Items Addressed This Visit             ICD-10-CM    Anxiety F41.9    Osteoporosis M81.0    Spinal stenosis M48.00    Gastrostomy status (CMS/[facility]) Z93.1    Polyneuropathy, unspecified G62.9    Neurogenic bladder N31.9     Other Visit Diagnoses         Codes    Healthcare maintenance    -  Primary Z00.00    Relevant Medications    lactobacillus (Culturelle) 10 billion cell capsule               The patient's medication list, drug allergy list, and immunization list were updated. She received a copy of each list in accordance with her request.    Additional paperwork was filled as directed. A copy of the paperwork was taken for the records of the clinic and will fax all to her asst living facility    Pt will follow up in March for Prolia inj    Meds refilled for pt to have in facility for next 3 months      F/up here in 3 months or prn sooner    Scribe Attestation  By signing my name below, IVernon , Scribe   attest that this documentation has been prepared under the direction and in the presence of Yu Conley DO.

## 2024-01-29 ENCOUNTER — APPOINTMENT (OUTPATIENT)
Dept: PRIMARY CARE | Facility: CLINIC | Age: 79
End: 2024-01-29
Payer: MEDICARE

## 2024-02-09 ENCOUNTER — TELEPHONE (OUTPATIENT)
Dept: PRIMARY CARE | Facility: CLINIC | Age: 79
End: 2024-02-09
Payer: MEDICARE

## 2024-02-09 DIAGNOSIS — N30.00 ACUTE CYSTITIS WITHOUT HEMATURIA: Primary | ICD-10-CM

## 2024-02-09 RX ORDER — NITROFURANTOIN 25; 75 MG/1; MG/1
100 CAPSULE ORAL 2 TIMES DAILY
Qty: 14 CAPSULE | Refills: 0 | Status: SHIPPED | OUTPATIENT
Start: 2024-02-09 | End: 2024-02-16

## 2024-02-09 NOTE — TELEPHONE ENCOUNTER
Called [facility] and they are not allowed to take verbals so Dr Velasquez called in antibiotic and daughter is aware to take to them.

## 2024-02-09 NOTE — TELEPHONE ENCOUNTER
On call note    PC from nursing home  Urine culture positive for infection  Sens to Macrobid  Hx of sepsis in the past from UTI  Ordered Macrobid bid for 7 days  Instructed to repeat UA if not improving.

## 2024-03-11 ENCOUNTER — OFFICE VISIT (OUTPATIENT)
Dept: PRIMARY CARE | Facility: CLINIC | Age: 79
End: 2024-03-11
Payer: MEDICARE

## 2024-03-11 VITALS
OXYGEN SATURATION: 95 % | WEIGHT: 114 LBS | TEMPERATURE: 97 F | DIASTOLIC BLOOD PRESSURE: 78 MMHG | HEART RATE: 100 BPM | SYSTOLIC BLOOD PRESSURE: 132 MMHG | BODY MASS INDEX: 23.03 KG/M2

## 2024-03-11 DIAGNOSIS — M81.0 AGE-RELATED OSTEOPOROSIS WITHOUT CURRENT PATHOLOGICAL FRACTURE: ICD-10-CM

## 2024-03-11 DIAGNOSIS — H61.23 EXCESSIVE CERUMEN IN BOTH EAR CANALS: ICD-10-CM

## 2024-03-11 DIAGNOSIS — L30.9 DERMATITIS: Primary | ICD-10-CM

## 2024-03-11 DIAGNOSIS — I10 HYPERTENSION, ESSENTIAL: ICD-10-CM

## 2024-03-11 DIAGNOSIS — K21.9 GASTRO-ESOPHAGEAL REFLUX DISEASE WITHOUT ESOPHAGITIS: ICD-10-CM

## 2024-03-11 DIAGNOSIS — N31.9 NEUROGENIC BLADDER: ICD-10-CM

## 2024-03-11 PROCEDURE — 1159F MED LIST DOCD IN RCRD: CPT | Performed by: FAMILY MEDICINE

## 2024-03-11 PROCEDURE — 3078F DIAST BP <80 MM HG: CPT | Performed by: FAMILY MEDICINE

## 2024-03-11 PROCEDURE — 1036F TOBACCO NON-USER: CPT | Performed by: FAMILY MEDICINE

## 2024-03-11 PROCEDURE — 3075F SYST BP GE 130 - 139MM HG: CPT | Performed by: FAMILY MEDICINE

## 2024-03-11 PROCEDURE — 1123F ACP DISCUSS/DSCN MKR DOCD: CPT | Performed by: FAMILY MEDICINE

## 2024-03-11 PROCEDURE — 99214 OFFICE O/P EST MOD 30 MIN: CPT | Performed by: FAMILY MEDICINE

## 2024-03-11 PROCEDURE — 1157F ADVNC CARE PLAN IN RCRD: CPT | Performed by: FAMILY MEDICINE

## 2024-03-11 PROCEDURE — 1126F AMNT PAIN NOTED NONE PRSNT: CPT | Performed by: FAMILY MEDICINE

## 2024-03-11 PROCEDURE — 1160F RVW MEDS BY RX/DR IN RCRD: CPT | Performed by: FAMILY MEDICINE

## 2024-03-11 RX ORDER — HYDROCORTISONE 25 MG/G
CREAM TOPICAL 2 TIMES DAILY PRN
Qty: 30 G | Refills: 5 | Status: SHIPPED | OUTPATIENT
Start: 2024-03-11 | End: 2025-03-11

## 2024-03-11 RX ORDER — PANTOPRAZOLE SODIUM 40 MG/1
40 TABLET, DELAYED RELEASE ORAL
Qty: 180 TABLET | Refills: 1 | Status: SHIPPED | OUTPATIENT
Start: 2024-03-11 | End: 2024-09-07

## 2024-03-11 ASSESSMENT — PAIN SCALES - GENERAL: PAINLEVEL: 0-NO PAIN

## 2024-03-11 NOTE — PROGRESS NOTES
Patient missed a AIDE visit from [facility] on 5-26-23.     Reason: Called and text patient no response back.       For your records only.   As per home health protocol, MD must be notified of missed/cancelled visits; therefore the prescribed frequency was not met.  Subjective   Patient ID: Rahel Cabezas is a 78 y.o. female who presents for Rash.    HPI   The patient presents to the clinic with complaints of a rash. She has past medical history of hypertension, hyperlipidemia, osteoporosis, astigmatism, IBS, PUD, GERD, atonic bladder, iron deficiency anemia, anxiety, major depressive disorder, hand arthritis, and spinal stenosis.    The patient reports that she had her bilateral Stage 2 InterStim peripheral nerve stimulation procedure on 03/01/2024 (with her urologist). Following the procedure, she was started on antibiotic medication (Clindamycin) until Wednesday (03/06/2024). However, she noticed a rash all over her body (chest, abdomen, back, buttocks, torso) on Friday (03/08/2024). She consulted with her nurse regarding this condition and she was informed that she may be allergic to Clindamycin antibiotic medication. Alternatively, she was informed that it may be a side-effect to Keflex medication that she has been taking 3 times per week (M, W, and F) since December 2023. She states that she temporarily discontinued Keflex medication, while she was taking Clindamycin medication. She started back on Keflex medication after she finished her round of Clindamycin antibiotic medication. To treat the rash, she was started on OTC Benadryl medication and she states that the medication has provided some relief in that the rash is not as bright red.  She does not have a lot of itching, just small area R lateral rib area near R breast and R upper arm..    She reports that she started experiencing frequent bowel movements on Sunday (03/10/2024). After consulting with her specialist, she was started on OTC Pepto Bismol. She states that she is now experiencing normal bowel movements. Never had diarrhea.  She was told may be partly due to stimulator and abx.  She has been off clindamycin now for 5 days    She reports that she recently got into a dispute with her daughter regarding  switching pharmacies. The patient states that she wants to stay with her local pharmacy as she has got medication from this pharmacy for a long time. She is also concerned about the quality of other pharmacies. Her daugher thinks that she should get medication from a pharmacy closer to her location.    She inquires if she can receive any recommendations to an ENT specialist to clear build-up of cerumen in her bilateral ears.  Will do referral    She continues on Protonix 40 mg for treatment of GERD symptoms. Her symptoms are control through this medication and she denies any side-effects.  She needs refill sent    Bp controlled today, she states has been elevated off and on    She would like prolia sent, she is due for inj in April    Review of Systems    Objective   /78 (BP Location: Right arm, Patient Position: Sitting, BP Cuff Size: Small adult)   Pulse 100   Temp 36.1 °C (97 °F) (Temporal)   Wt 51.7 kg (114 lb)   SpO2 95%   BMI 23.03 kg/m²     Physical Exam  Constitutional:       Appearance: Normal appearance.   Neck:      Vascular: No carotid bruit.   Cardiovascular:      Rate and Rhythm: Normal rate and regular rhythm.      Pulses: Normal pulses.      Heart sounds: Normal heart sounds.   Pulmonary:      Effort: Pulmonary effort is normal.      Breath sounds: Normal breath sounds.   Musculoskeletal:         General: No tenderness. Normal range of motion.      Cervical back: Normal range of motion.   Skin:     Findings: Rash present.      Comments: Pt has macular rash torso front and back and buttocks..  It is erythamtous and pt states has faded over past several days.  No excoriation.     Neurological:      General: No focal deficit present.      Mental Status: She is alert and oriented to person, place, and time.   Psychiatric:         Mood and Affect: Mood normal.         Thought Content: Thought content normal.         Judgment: Judgment normal.         Assessment/Plan          The patient  received a refill for her Protonix 40 mg prescription. She was instructed to continue taking this medication as previously directed.    Sent Prolia rf so can have inj in April.  Due for dexa in 8/2025    In regards to concerns with rash symptoms, the patient received a prescription for hydrocortisone cream 2.5 % to alleviate itching in small areas.. The patient was also advised to continue taking OTC Benadryl as previously directed. She was advised to consult with her urologist regarding this condition as he has prescribed Keflex medication and may cause rash but clindamycin more likeley. In addition, she may receive a referral to an allergist in the near future if continues having problems. She will let me know Continue monitoring symptoms for improvement/exacerbation.    She will follow up for hr next regular 3 month appt.    Annabel Attestation  By signing my name below, IVernon Scribe   attest that this documentation has been prepared under the direction and in the presence of Yu Conley DO.

## 2024-03-11 NOTE — PROGRESS NOTES
Subjective   Patient ID: Rahel Cabezas is a 78 y.o. female who presents for Rash.    Rash       The patient presents to the clinic with complaints of a rash. She has past medical history of hypertension, hyperlipidemia, osteoporosis, astigmatism, IBS, PUD, GERD, atonic bladder, iron deficiency anemia, anxiety, major depressive disorder, hand arthritis, and spinal stenosis.    The patient reports that she had her bilateral Stage 2 InterStim peripheral nerve stimulation procedure on 03/01/2024 (with her urologist). Following the procedure, she was started on antibiotic medication (Clindamycin) until Wednesday (03/06/2024). However, she noticed a rash all over her body (chest, abdomen, back, buttocks, torso) on Friday (03/08/2024). She consulted with her nurse regarding this condition and she was informed that she may be allergic to Clindamycin antibiotic medication. Alternatively, she was informed that it may be a side-effect to Keflex medication that she has been taking 3 times per week (M, W, and F) since December 2023. She states that she temporarily discontinued Keflex medication, while she was taking Clindamycin medication. She started back on Keflex medication after she finished her round of Clindamycin antibiotic medication. To treat the rash, she was started on OTC Benadryl medication and she states that the medication has provided some relief in that the rash is not as bright red.  She does not have a lot of itching, just small area R lateral rib area near R breast and R upper arm..    She reports that she started experiencing frequent bowel movements on Sunday (03/10/2024). After consulting with her specialist, she was started on OTC Pepto Bismol. She states that she is now experiencing normal bowel movements. Never had diarrhea.  She was told may be partly due to stimulator and abx.  She has been off clindamycin now for 5 days    She reports that she recently got into a dispute with her daughter regarding  switching pharmacies. The patient states that she wants to stay with her local pharmacy as she has got medication from this pharmacy for a long time. She is also concerned about the quality of other pharmacies. Her daugher thinks that she should get medication from a pharmacy closer to her location.    She inquires if she can receive any recommendations to an ENT specialist to clear build-up of cerumen in her bilateral ears.  Will do referral    She continues on Protonix 40 mg for treatment of GERD symptoms. Her symptoms are control through this medication and she denies any side-effects.  She needs refill sent    Bp controlled today, she states has been elevated off and on    She would like prolia sent, she is due for inj in April    Review of Systems   Skin:  Positive for rash.       Objective   /78 (BP Location: Right arm, Patient Position: Sitting, BP Cuff Size: Small adult)   Pulse 100   Temp 36.1 °C (97 °F) (Temporal)   Wt 51.7 kg (114 lb)   SpO2 95%   BMI 23.03 kg/m²     Physical Exam  Constitutional:       Appearance: Normal appearance.   Neck:      Vascular: No carotid bruit.   Cardiovascular:      Rate and Rhythm: Normal rate and regular rhythm.      Pulses: Normal pulses.      Heart sounds: Normal heart sounds.   Pulmonary:      Effort: Pulmonary effort is normal.      Breath sounds: Normal breath sounds.   Musculoskeletal:         General: No tenderness. Normal range of motion.      Cervical back: Normal range of motion.   Skin:     Findings: Rash present.      Comments: Pt has macular rash torso front and back and buttocks..  It is erythamtous and pt states has faded over past several days.  No excoriation.     Neurological:      General: No focal deficit present.      Mental Status: She is alert and oriented to person, place, and time.   Psychiatric:         Mood and Affect: Mood normal.         Thought Content: Thought content normal.         Judgment: Judgment normal.          Assessment/Plan   Problem List Items Addressed This Visit             ICD-10-CM    Hypertension, essential I10    Osteoporosis M81.0    Relevant Medications    denosumab (Prolia) 60 mg/mL syringe    Gastro-esophageal reflux disease without esophagitis K21.9    Relevant Medications    pantoprazole (ProtoNix) 40 mg EC tablet    Neurogenic bladder N31.9     Other Visit Diagnoses         Codes    Dermatitis    -  Primary L30.9    Relevant Medications    hydrocortisone 2.5 % cream    Excessive cerumen in both ear canals     H61.23    Relevant Orders    Referral to ENT               The patient received a refill for her Protonix 40 mg prescription. She was instructed to continue taking this medication as previously directed.    Sent Prolia rf so can have inj in April.  Due for dexa in 8/2025    In regards to concerns with rash symptoms, the patient received a prescription for hydrocortisone cream 2.5 % to alleviate itching in small areas.. The patient was also advised to continue taking OTC Benadryl as previously directed. She was advised to consult with her urologist regarding this condition as he has prescribed Keflex medication and may cause rash but clindamycin more likeley. In addition, she may receive a referral to an allergist in the near future if continues having problems. She will let me know Continue monitoring symptoms for improvement/exacerbation.    She will follow up for hr next regular 3 month appt.    Annabel Attestation  By signing my name below, I, Annabel Olivia   attest that this documentation has been prepared under the direction and in the presence of Yu Conley DO.

## 2024-03-13 PROCEDURE — RXMED WILLOW AMBULATORY MEDICATION CHARGE

## 2024-03-15 ENCOUNTER — SPECIALTY PHARMACY (OUTPATIENT)
Dept: PHARMACY | Facility: CLINIC | Age: 79
End: 2024-03-15

## 2024-03-19 ENCOUNTER — TELEPHONE (OUTPATIENT)
Dept: PRIMARY CARE | Facility: CLINIC | Age: 79
End: 2024-03-19
Payer: MEDICARE

## 2024-03-19 NOTE — TELEPHONE ENCOUNTER
----- Message from Aquiles De La O CMA sent at 3/15/2024 11:33 AM EDT -----  Regarding: FW: coordination of delivery    ----- Message -----  From: Lyndsay Casillas  Sent: 3/15/2024   9:46 AM EDT  To: Braxton Brewer; Aquiles De La O CMA  Subject: coordination of delivery                         Hello,    I'm contacting you on behalf of [facility].  Based on our records, this patient has an order for prolia to refill and we would like to schedule the delivery.        Appointment Date:   Delivery Address:   Requested Delivery Date:   Delivery Contact:   Delivery Hours:   Delivery Phone:     Thank you,    Lnydsay Casillas [facility]  Pharmacy Support Liaison   Specialty Pharmacy  [phone]

## 2024-03-20 ENCOUNTER — PHARMACY VISIT (OUTPATIENT)
Dept: PHARMACY | Facility: CLINIC | Age: 79
End: 2024-03-20
Payer: COMMERCIAL

## 2024-04-03 DIAGNOSIS — K58.9 IRRITABLE BOWEL SYNDROME, UNSPECIFIED TYPE: ICD-10-CM

## 2024-04-03 RX ORDER — NORTRIPTYLINE HYDROCHLORIDE 25 MG/1
50 CAPSULE ORAL EVERY EVENING
Qty: 180 CAPSULE | Refills: 0 | Status: SHIPPED | OUTPATIENT
Start: 2024-04-03 | End: 2024-07-02

## 2024-04-06 DIAGNOSIS — G62.9 POLYNEUROPATHY, UNSPECIFIED: ICD-10-CM

## 2024-04-08 RX ORDER — GABAPENTIN 600 MG/1
600 TABLET ORAL 3 TIMES DAILY
Qty: 270 TABLET | Refills: 0 | Status: SHIPPED | OUTPATIENT
Start: 2024-04-08 | End: 2024-07-07

## 2024-04-18 ENCOUNTER — OFFICE VISIT (OUTPATIENT)
Dept: PRIMARY CARE | Facility: CLINIC | Age: 79
End: 2024-04-18
Payer: MEDICARE

## 2024-04-18 DIAGNOSIS — M81.0 OSTEOPOROSIS WITHOUT CURRENT PATHOLOGICAL FRACTURE, UNSPECIFIED OSTEOPOROSIS TYPE: ICD-10-CM

## 2024-04-18 PROCEDURE — 1123F ACP DISCUSS/DSCN MKR DOCD: CPT | Performed by: FAMILY MEDICINE

## 2024-04-18 PROCEDURE — 96372 THER/PROPH/DIAG INJ SC/IM: CPT | Performed by: FAMILY MEDICINE

## 2024-04-18 PROCEDURE — 1159F MED LIST DOCD IN RCRD: CPT | Performed by: FAMILY MEDICINE

## 2024-04-18 PROCEDURE — 1157F ADVNC CARE PLAN IN RCRD: CPT | Performed by: FAMILY MEDICINE

## 2024-04-18 PROCEDURE — 1160F RVW MEDS BY RX/DR IN RCRD: CPT | Performed by: FAMILY MEDICINE

## 2024-05-02 ENCOUNTER — APPOINTMENT (OUTPATIENT)
Dept: OTOLARYNGOLOGY | Facility: CLINIC | Age: 79
End: 2024-05-02
Payer: MEDICARE

## 2024-05-02 ENCOUNTER — TELEPHONE (OUTPATIENT)
Dept: PRIMARY CARE | Facility: CLINIC | Age: 79
End: 2024-05-02

## 2024-05-02 DIAGNOSIS — R39.9 UTI SYMPTOMS: Primary | ICD-10-CM

## 2024-05-02 NOTE — TELEPHONE ENCOUNTER
[facility] NEEDS A ORDER FAXED TO THEM FOR A UA PATIENT IS HAVING BURNING AND FREQUENCY AND FOULD SMELL.   FAX # [phone]

## 2024-05-15 ENCOUNTER — TELEPHONE (OUTPATIENT)
Dept: PRIMARY CARE | Facility: CLINIC | Age: 79
End: 2024-05-15
Payer: MEDICARE

## 2024-05-15 NOTE — TELEPHONE ENCOUNTER
SONJA FROM [facility] CALLING ON ONEIL WASSERMAN  SHE WAS SENT OUT TO [facility]  SHE WAS SWEATING PROFUSELY AND HERE B/P WAS 66/42  OXYGEN 96% AND RESPERS 19  THEY WANTED YOU TO KNOW.

## 2024-05-20 ENCOUNTER — TELEPHONE (OUTPATIENT)
Dept: PRIMARY CARE | Facility: CLINIC | Age: 79
End: 2024-05-20
Payer: MEDICARE

## 2024-05-20 ENCOUNTER — PATIENT OUTREACH (OUTPATIENT)
Dept: CARE COORDINATION | Facility: CLINIC | Age: 79
End: 2024-05-20
Payer: MEDICARE

## 2024-05-20 NOTE — TELEPHONE ENCOUNTER
While patient was in hospital they stopped her lisinopril should he continue to not take it or should she start it again please advise.

## 2024-05-20 NOTE — PROGRESS NOTES
Discharge Facility:[facility]  Discharge Diagnosis:Hyponatremis  Admission Date:05/15/24  Discharge Date: 05/17/24    PCP Appointment Date:none available sent to pcp office  Specialist Appointment Date:   Hospital Encounter and Summary: Linked   See discharge assessment below for further details  Engagement  Call Start Time: 0847 (5/20/2024  8:47 AM)    Medications  Medications reviewed with patient/caregiver?: Yes (no new meds) (5/20/2024  8:47 AM)  Is the patient having any side effects they believe may be caused by any medication additions or changes?: No (5/20/2024  8:47 AM)  Does the patient have all medications ordered at discharge?: Yes (5/20/2024  8:47 AM)  Is the patient taking all medications as directed (includes completed medication regime)?: Not applicable (5/20/2024  8:47 AM)    Appointments  Does the patient have a primary care provider?: Yes (5/20/2024  8:47 AM)  Care Management Interventions: Advised patient to make appointment (5/20/2024  8:47 AM)    Self Management  Has home health visited the patient within 72 hours of discharge?: Not applicable (5/20/2024  8:47 AM)  Has all Durable Medical Equipment (DME) been delivered?: No (5/20/2024  8:47 AM)    Patient Teaching  Does the patient have access to their discharge instructions?: Yes (5/20/2024  8:47 AM)  Care Management Interventions: Reviewed instructions with patient (5/20/2024  8:47 AM)  What is the patient's perception of their health status since discharge?: Improving (5/20/2024  8:47 AM)    Wrap Up  Call End Time: 0855 (5/20/2024  8:47 AM)

## 2024-05-28 ENCOUNTER — HOSPITAL ENCOUNTER (OUTPATIENT)
Dept: RADIOLOGY | Facility: CLINIC | Age: 79
Discharge: HOME | End: 2024-05-28
Payer: MEDICARE

## 2024-05-28 VITALS — HEIGHT: 59 IN | BODY MASS INDEX: 22.98 KG/M2 | WEIGHT: 114 LBS

## 2024-05-28 DIAGNOSIS — Z12.31 BREAST CANCER SCREENING BY MAMMOGRAM: ICD-10-CM

## 2024-05-28 PROCEDURE — 77063 BREAST TOMOSYNTHESIS BI: CPT | Performed by: RADIOLOGY

## 2024-05-28 PROCEDURE — 77067 SCR MAMMO BI INCL CAD: CPT

## 2024-05-28 PROCEDURE — 77067 SCR MAMMO BI INCL CAD: CPT | Performed by: RADIOLOGY

## 2024-05-29 ENCOUNTER — PATIENT OUTREACH (OUTPATIENT)
Dept: CARE COORDINATION | Facility: CLINIC | Age: 79
End: 2024-05-29
Payer: MEDICARE

## 2024-05-30 ENCOUNTER — HOSPITAL ENCOUNTER (OUTPATIENT)
Dept: RADIOLOGY | Facility: EXTERNAL LOCATION | Age: 79
Discharge: HOME | End: 2024-05-30
Payer: MEDICARE

## 2024-06-03 ENCOUNTER — TELEPHONE (OUTPATIENT)
Dept: PRIMARY CARE | Facility: CLINIC | Age: 79
End: 2024-06-03
Payer: MEDICARE

## 2024-06-03 NOTE — TELEPHONE ENCOUNTER
----- Message from Yu Conley DO sent at 6/2/2024 12:16 PM EDT -----  Report to pt her mamm is wnl and can repeat in 1-2 years

## 2024-06-13 ENCOUNTER — PATIENT OUTREACH (OUTPATIENT)
Dept: CARE COORDINATION | Facility: CLINIC | Age: 79
End: 2024-06-13
Payer: MEDICARE

## 2024-07-16 ENCOUNTER — TELEPHONE (OUTPATIENT)
Dept: PRIMARY CARE | Facility: CLINIC | Age: 79
End: 2024-07-16
Payer: MEDICARE

## 2024-07-16 DIAGNOSIS — K58.9 IRRITABLE BOWEL SYNDROME, UNSPECIFIED TYPE: ICD-10-CM

## 2024-07-16 DIAGNOSIS — G62.9 POLYNEUROPATHY, UNSPECIFIED: ICD-10-CM

## 2024-07-16 RX ORDER — GABAPENTIN 600 MG/1
600 TABLET ORAL 3 TIMES DAILY
Qty: 270 TABLET | Refills: 0 | Status: SHIPPED | OUTPATIENT
Start: 2024-07-16 | End: 2024-10-14

## 2024-07-16 RX ORDER — NORTRIPTYLINE HYDROCHLORIDE 25 MG/1
50 CAPSULE ORAL EVERY EVENING
Qty: 180 CAPSULE | Refills: 0 | Status: SHIPPED | OUTPATIENT
Start: 2024-07-16 | End: 2024-10-14

## 2024-07-16 NOTE — TELEPHONE ENCOUNTER
Medication Name: GABAPENTIN  Dose: 600MG  Frequency: 3 TIMES DAILY    Medication Name: NORTIPTYLINE  Dose: 50 MG  Frequency: DAILY AT NIGHT  Pharmacy: [facility]  Quantity left:  Last appointment: 3.11.24 FOR A RASH  Last CPE:  Last MCW: 3.2022  Next appointment: NOT SCHEDULED  Next CPE:  Next MCW: NOT SCHEDULED    VOICEMAIL LEFT

## 2024-07-19 ENCOUNTER — OFFICE VISIT (OUTPATIENT)
Dept: PRIMARY CARE | Facility: CLINIC | Age: 79
End: 2024-07-19
Payer: MEDICARE

## 2024-07-19 VITALS
WEIGHT: 118 LBS | BODY MASS INDEX: 23.79 KG/M2 | RESPIRATION RATE: 16 BRPM | HEART RATE: 73 BPM | OXYGEN SATURATION: 96 % | HEIGHT: 59 IN | SYSTOLIC BLOOD PRESSURE: 134 MMHG | DIASTOLIC BLOOD PRESSURE: 70 MMHG

## 2024-07-19 DIAGNOSIS — B34.9 VIRAL SYNDROME: Primary | ICD-10-CM

## 2024-07-19 PROCEDURE — 1123F ACP DISCUSS/DSCN MKR DOCD: CPT | Performed by: FAMILY MEDICINE

## 2024-07-19 PROCEDURE — 1160F RVW MEDS BY RX/DR IN RCRD: CPT | Performed by: FAMILY MEDICINE

## 2024-07-19 PROCEDURE — 3078F DIAST BP <80 MM HG: CPT | Performed by: FAMILY MEDICINE

## 2024-07-19 PROCEDURE — 1157F ADVNC CARE PLAN IN RCRD: CPT | Performed by: FAMILY MEDICINE

## 2024-07-19 PROCEDURE — 1036F TOBACCO NON-USER: CPT | Performed by: FAMILY MEDICINE

## 2024-07-19 PROCEDURE — 3075F SYST BP GE 130 - 139MM HG: CPT | Performed by: FAMILY MEDICINE

## 2024-07-19 PROCEDURE — 1159F MED LIST DOCD IN RCRD: CPT | Performed by: FAMILY MEDICINE

## 2024-07-19 PROCEDURE — 99213 OFFICE O/P EST LOW 20 MIN: CPT | Performed by: FAMILY MEDICINE

## 2024-07-19 NOTE — PROGRESS NOTES
"Subjective   Patient ID: Rahel Cabezas is a 79 y.o. female who presents for Sinusitis and Cough.    HPI  PCP: Dr. Velasquez    Reports watery eyes, rhinorrhea, post nasal gtt, cough x 4 days.  No nasal congestion, sinus pressure, sore throat, SOB, wheeze, fever, chills, loss of smell or taste, nausea, vomiting, diarrhea, headaches, body aches.  Tried otc sinus tab.  States she has eye drops to help the watery eyes.  Did not take home covid test.    Review of Systems  No other complaints.     Objective   /70   Pulse 73   Resp 16   Ht 1.499 m (4' 11\")   Wt 53.5 kg (118 lb)   LMP  (LMP Unknown)   SpO2 96%   BMI 23.83 kg/m²     Physical Exam  Constitutional:       General: She is not in acute distress.     Appearance: She is normal weight.   HENT:      Right Ear: Tympanic membrane normal.      Left Ear: Tympanic membrane normal.      Nose:      Right Sinus: Maxillary sinus tenderness present. No frontal sinus tenderness.      Left Sinus: Maxillary sinus tenderness present. No frontal sinus tenderness.   Eyes:      Comments: No conjunctival redness   Cardiovascular:      Rate and Rhythm: Normal rate and regular rhythm.      Heart sounds: Normal heart sounds. No murmur heard.     No friction rub. No gallop.   Pulmonary:      Effort: Pulmonary effort is normal.      Breath sounds: Normal breath sounds. No wheezing, rhonchi or rales.   Lymphadenopathy:      Cervical: No cervical adenopathy.   Neurological:      Mental Status: She is oriented to person, place, and time.   Psychiatric:         Mood and Affect: Mood normal.         Behavior: Behavior normal.     Assessment/Plan   Diagnoses and all orders for this visit:  Viral syndrome    Recommend supportive, symptomatic therapies.  Call, send message through [employer], or return to office prn if these symptoms worsen or fail to improve as anticipated.     F/U w/PCP.   "

## 2024-07-19 NOTE — PATIENT INSTRUCTIONS
Recommend supportive, symptomatic therapies.  Call, send message through [employer], or return to office prn if these symptoms worsen or fail to improve as anticipated.     F/U w/PCP.

## 2024-07-22 DIAGNOSIS — J32.9 SINUSITIS, UNSPECIFIED CHRONICITY, UNSPECIFIED LOCATION: Primary | ICD-10-CM

## 2024-07-22 RX ORDER — CEFDINIR 300 MG/1
300 CAPSULE ORAL 2 TIMES DAILY
Qty: 20 CAPSULE | Refills: 0 | Status: SHIPPED | OUTPATIENT
Start: 2024-07-22 | End: 2024-08-01

## 2024-08-13 ENCOUNTER — PATIENT OUTREACH (OUTPATIENT)
Dept: CARE COORDINATION | Facility: CLINIC | Age: 79
End: 2024-08-13
Payer: MEDICARE

## 2024-08-20 ENCOUNTER — APPOINTMENT (OUTPATIENT)
Dept: OPHTHALMOLOGY | Facility: CLINIC | Age: 79
End: 2024-08-20
Payer: MEDICARE

## 2024-08-29 ENCOUNTER — APPOINTMENT (OUTPATIENT)
Dept: PRIMARY CARE | Facility: CLINIC | Age: 79
End: 2024-08-29
Payer: MEDICARE

## 2024-08-29 ENCOUNTER — TELEPHONE (OUTPATIENT)
Dept: PRIMARY CARE | Facility: CLINIC | Age: 79
End: 2024-08-29

## 2024-08-29 NOTE — TELEPHONE ENCOUNTER
Patient was given an antibiotic from urology but finished it she is still having a lot of pressure and burning she said she tried to straight cath to see if that would help but no luck was to been seen we have nothing can you call in another round of antibiotics she can not get in with urologist

## 2024-09-04 ENCOUNTER — APPOINTMENT (OUTPATIENT)
Dept: PRIMARY CARE | Facility: CLINIC | Age: 79
End: 2024-09-04
Payer: MEDICARE

## 2024-09-09 ENCOUNTER — APPOINTMENT (OUTPATIENT)
Dept: PRIMARY CARE | Facility: CLINIC | Age: 79
End: 2024-09-09
Payer: MEDICARE

## 2024-09-09 VITALS
SYSTOLIC BLOOD PRESSURE: 138 MMHG | HEART RATE: 101 BPM | HEIGHT: 57 IN | DIASTOLIC BLOOD PRESSURE: 86 MMHG | WEIGHT: 119 LBS | BODY MASS INDEX: 25.67 KG/M2 | OXYGEN SATURATION: 97 %

## 2024-09-09 DIAGNOSIS — N31.9 NEUROGENIC BLADDER: ICD-10-CM

## 2024-09-09 DIAGNOSIS — K21.9 GASTRO-ESOPHAGEAL REFLUX DISEASE WITHOUT ESOPHAGITIS: ICD-10-CM

## 2024-09-09 DIAGNOSIS — F41.9 ANXIETY: ICD-10-CM

## 2024-09-09 DIAGNOSIS — M81.0 AGE-RELATED OSTEOPOROSIS WITHOUT CURRENT PATHOLOGICAL FRACTURE: ICD-10-CM

## 2024-09-09 DIAGNOSIS — Z00.00 MEDICARE ANNUAL WELLNESS VISIT, SUBSEQUENT: Primary | ICD-10-CM

## 2024-09-09 DIAGNOSIS — I10 PRIMARY HYPERTENSION: ICD-10-CM

## 2024-09-09 DIAGNOSIS — G62.9 POLYNEUROPATHY, UNSPECIFIED: ICD-10-CM

## 2024-09-09 DIAGNOSIS — Z23 NEED FOR INFLUENZA VACCINATION: ICD-10-CM

## 2024-09-09 DIAGNOSIS — I10 HYPERTENSION, ESSENTIAL: ICD-10-CM

## 2024-09-09 DIAGNOSIS — K58.9 IRRITABLE BOWEL SYNDROME, UNSPECIFIED TYPE: ICD-10-CM

## 2024-09-09 DIAGNOSIS — Z00.00 ROUTINE GENERAL MEDICAL EXAMINATION AT HEALTH CARE FACILITY: ICD-10-CM

## 2024-09-09 PROCEDURE — 1123F ACP DISCUSS/DSCN MKR DOCD: CPT | Performed by: FAMILY MEDICINE

## 2024-09-09 PROCEDURE — G0008 ADMIN INFLUENZA VIRUS VAC: HCPCS | Performed by: FAMILY MEDICINE

## 2024-09-09 PROCEDURE — 1170F FXNL STATUS ASSESSED: CPT | Performed by: FAMILY MEDICINE

## 2024-09-09 PROCEDURE — 90662 IIV NO PRSV INCREASED AG IM: CPT | Performed by: FAMILY MEDICINE

## 2024-09-09 PROCEDURE — 99215 OFFICE O/P EST HI 40 MIN: CPT | Performed by: FAMILY MEDICINE

## 2024-09-09 PROCEDURE — 1159F MED LIST DOCD IN RCRD: CPT | Performed by: FAMILY MEDICINE

## 2024-09-09 PROCEDURE — 99214 OFFICE O/P EST MOD 30 MIN: CPT | Performed by: FAMILY MEDICINE

## 2024-09-09 PROCEDURE — 3079F DIAST BP 80-89 MM HG: CPT | Performed by: FAMILY MEDICINE

## 2024-09-09 PROCEDURE — 3075F SYST BP GE 130 - 139MM HG: CPT | Performed by: FAMILY MEDICINE

## 2024-09-09 PROCEDURE — 1157F ADVNC CARE PLAN IN RCRD: CPT | Performed by: FAMILY MEDICINE

## 2024-09-09 PROCEDURE — 1160F RVW MEDS BY RX/DR IN RCRD: CPT | Performed by: FAMILY MEDICINE

## 2024-09-09 PROCEDURE — 99397 PER PM REEVAL EST PAT 65+ YR: CPT | Performed by: FAMILY MEDICINE

## 2024-09-09 PROCEDURE — 1036F TOBACCO NON-USER: CPT | Performed by: FAMILY MEDICINE

## 2024-09-09 RX ORDER — GABAPENTIN 600 MG/1
600 TABLET ORAL 3 TIMES DAILY
Qty: 270 TABLET | Refills: 0 | Status: SHIPPED | OUTPATIENT
Start: 2024-09-09 | End: 2024-12-08

## 2024-09-09 RX ORDER — LISINOPRIL 5 MG/1
5 TABLET ORAL DAILY
Qty: 90 TABLET | Refills: 1 | Status: SHIPPED | OUTPATIENT
Start: 2024-09-09 | End: 2025-03-08

## 2024-09-09 RX ORDER — PANTOPRAZOLE SODIUM 40 MG/1
40 TABLET, DELAYED RELEASE ORAL
Qty: 180 TABLET | Refills: 1 | Status: SHIPPED | OUTPATIENT
Start: 2024-09-09 | End: 2025-03-08

## 2024-09-09 RX ORDER — NORTRIPTYLINE HYDROCHLORIDE 25 MG/1
50 CAPSULE ORAL EVERY EVENING
Qty: 180 CAPSULE | Refills: 0 | Status: SHIPPED | OUTPATIENT
Start: 2024-09-09 | End: 2024-12-08

## 2024-09-09 ASSESSMENT — ENCOUNTER SYMPTOMS
DEPRESSION: 0
OCCASIONAL FEELINGS OF UNSTEADINESS: 0
LOSS OF SENSATION IN FEET: 0

## 2024-09-09 ASSESSMENT — ACTIVITIES OF DAILY LIVING (ADL)
DRESSING: INDEPENDENT
BATHING: INDEPENDENT
MANAGING_FINANCES: INDEPENDENT
TAKING_MEDICATION: INDEPENDENT
GROCERY_SHOPPING: INDEPENDENT
DOING_HOUSEWORK: INDEPENDENT

## 2024-09-09 ASSESSMENT — PATIENT HEALTH QUESTIONNAIRE - PHQ9
SUM OF ALL RESPONSES TO PHQ9 QUESTIONS 1 AND 2: 0
2. FEELING DOWN, DEPRESSED OR HOPELESS: NOT AT ALL
1. LITTLE INTEREST OR PLEASURE IN DOING THINGS: NOT AT ALL

## 2024-09-09 NOTE — ASSESSMENT & PLAN NOTE
Will call MA this month so can get prolia sent for 10/18/2024       Medications have been refilled.     She is s/p unilateral Interstim placement on 3/1/2024. Patient reports stimulator is not working well. She had some urinary incontinence with the stimulator. She increased performing self-catheterization from 2 to 3 times/day. She will discuss further treatment options with urologist on 9/18/2024.

## 2024-09-09 NOTE — ASSESSMENT & PLAN NOTE
Needs urine drug screen and CSA, will do next OV    Orders:    gabapentin (Neurontin) 600 mg tablet; Take 1 tablet (600 mg) by mouth 3 times a day.

## 2024-09-09 NOTE — PROGRESS NOTES
"Subjective   Reason for Visit: Rahel Cabezas is an 79 y.o. female here for a Medicare Wellness visit.     Past Medical, Surgical, and Family History reviewed and updated in chart.    Reviewed all medications by prescribing practitioner or clinical pharmacist (such as prescriptions, OTCs, herbal therapies and supplements) and documented in the medical record.    HPI     The patient presents today for a medicare wellness visit. She has past medical history of hypertension, hyperlipidemia, osteoporosis, astigmatism, IBS, PUD, GERD, atonic bladder, iron deficiency anemia, anxiety, major depressive disorder, hand arthritis, and spinal stenosis.     She reports she presented to [facility] ER on 8/29 with complaints of dysuria with frequency and urgency. She was treated for a UTI with Bactrim. She is scheduled for a follow up with urology on 9/18/2024.     She is s/p unilateral Interstim placement on 3/1/2024. Patient reports stimulator is not working well. She had some urinary incontinence with the stimulator. She increased performing self-catheterization from 2 to 3 times/day.    Patient moved into assisted living.  She is still driving but has help and is closely monitored since she previously passed out at home due to sepsis.      Patient notes no change in medication, she needs refills of her medication today.    Due for prolia in October.    Was due for colonoscopy in Jan, she may want to schedule at [facility], she will let me know if wants ordered in [place].          Patient Care Team:  Yu Conley DO as PCP - General (Family Medicine)  Yu Conley DO as PCP - [employer] PCP     Review of Systems    Objective   Vitals:  /86   Pulse 101   Ht 1.448 m (4' 9\")   Wt 54 kg (119 lb)   LMP  (LMP Unknown)   SpO2 97%   BMI 25.75 kg/m²       Physical Exam  Constitutional:       Appearance: Normal appearance.   Neck:      Vascular: No carotid bruit.   Cardiovascular:      Rate and " Rhythm: Normal rate and regular rhythm.      Pulses: Normal pulses.      Heart sounds: Normal heart sounds.   Pulmonary:      Effort: Pulmonary effort is normal.      Breath sounds: Normal breath sounds.   Abdominal:      General: Bowel sounds are normal.      Palpations: Abdomen is soft. There is no mass.      Tenderness: There is no abdominal tenderness.   Musculoskeletal:         General: Normal range of motion.      Cervical back: Normal range of motion.      Right lower leg: No edema.      Left lower leg: No edema.   Lymphadenopathy:      Cervical: No cervical adenopathy.   Skin:     General: Skin is warm and dry.   Neurological:      Mental Status: She is alert and oriented to person, place, and time.   Psychiatric:         Mood and Affect: Mood normal.         Behavior: Behavior normal.         Thought Content: Thought content normal.         Judgment: Judgment normal.         Assessment & Plan  Medicare annual wellness visit, subsequent  Reeviewed vaccines , HD flu today.  Will get covid at pharmacy.  Utronald w pnumonia vaccine.  Wants to wait for tdap until after first of year    Colonoscopy due, she is not sure where she wants to do this, she will call once decides and will do referral    Orders:    Flu vaccine, trivalent, preservative free, HIGH-DOSE, age 65y+ (Fluzone)     Routine general medical examination at health care facility         Gastro-esophageal reflux disease without esophagitis  Med controlling gerd  well per pt    Orders:    pantoprazole (ProtoNix) 40 mg EC tablet; Take 1 tablet (40 mg) by mouth 2 times a day before meals.    Irritable bowel syndrome, unspecified type    Orders:    nortriptyline (Pamelor) 25 mg capsule; Take 2 capsules (50 mg) by mouth once daily in the evening.    Primary hypertension    Orders:    lisinopril 5 mg tablet; Take 1 tablet (5 mg) by mouth once daily.    Polyneuropathy, unspecified  Needs urine drug screen and CSA, will do next OV    Orders:    gabapentin  (Neurontin) 600 mg tablet; Take 1 tablet (600 mg) by mouth 3 times a day.    Age-related osteoporosis without current pathological fracture  Will call MA this month so can get prolia sent for 10/18/2024       Medications have been refilled.     She is s/p unilateral Interstim placement on 3/1/2024. Patient reports stimulator is not working well. She had some urinary incontinence with the stimulator. She increased performing self-catheterization from 2 to 3 times/day. She will discuss further treatment options with urologist on 9/18/2024.       Need for influenza vaccination    Orders:    Flu vaccine, trivalent, preservative free, HIGH-DOSE, age 65y+ (Fluzone)    Hypertension, essential         Neurogenic bladder         Anxiety                Follow up in 3 months or sooner if needed.     Scribe Attestation  By signing my name below, IAnne Scribe   attest that this documentation has been prepared under the direction and in the presence of Yu Conley DO.

## 2024-09-09 NOTE — ASSESSMENT & PLAN NOTE
Orders:    nortriptyline (Pamelor) 25 mg capsule; Take 2 capsules (50 mg) by mouth once daily in the evening.

## 2024-09-09 NOTE — ASSESSMENT & PLAN NOTE
Med controlling gerd  well per pt    Orders:    pantoprazole (ProtoNix) 40 mg EC tablet; Take 1 tablet (40 mg) by mouth 2 times a day before meals.

## 2024-09-13 DIAGNOSIS — M81.0 AGE-RELATED OSTEOPOROSIS WITHOUT CURRENT PATHOLOGICAL FRACTURE: ICD-10-CM

## 2024-09-25 ENCOUNTER — SPECIALTY PHARMACY (OUTPATIENT)
Dept: PHARMACY | Facility: CLINIC | Age: 79
End: 2024-09-25

## 2024-10-04 PROCEDURE — RXMED WILLOW AMBULATORY MEDICATION CHARGE

## 2024-10-07 DIAGNOSIS — G62.9 POLYNEUROPATHY, UNSPECIFIED: ICD-10-CM

## 2024-10-07 RX ORDER — GABAPENTIN 600 MG/1
600 TABLET ORAL 3 TIMES DAILY
Qty: 270 TABLET | Refills: 0 | Status: SHIPPED | OUTPATIENT
Start: 2024-10-07 | End: 2025-01-05

## 2024-10-08 ENCOUNTER — SPECIALTY PHARMACY (OUTPATIENT)
Dept: PHARMACY | Facility: CLINIC | Age: 79
End: 2024-10-08

## 2024-10-10 ENCOUNTER — SPECIALTY PHARMACY (OUTPATIENT)
Dept: PHARMACY | Facility: CLINIC | Age: 79
End: 2024-10-10

## 2024-10-10 ENCOUNTER — PHARMACY VISIT (OUTPATIENT)
Dept: PHARMACY | Facility: CLINIC | Age: 79
End: 2024-10-10
Payer: COMMERCIAL

## 2024-10-11 ENCOUNTER — TELEPHONE (OUTPATIENT)
Dept: PRIMARY CARE | Facility: CLINIC | Age: 79
End: 2024-10-11
Payer: MEDICARE

## 2024-10-11 PROBLEM — Z86.718 HISTORY OF DVT (DEEP VEIN THROMBOSIS): Status: ACTIVE | Noted: 2024-02-02

## 2024-10-11 PROBLEM — K81.9 CHOLECYSTITIS: Status: ACTIVE | Noted: 2024-10-11

## 2024-10-11 PROBLEM — R19.8 INTESTINAL OBSTRUCTION DUE TO DECREASED PERISTALSIS (MULTI): Status: ACTIVE | Noted: 2024-10-11

## 2024-10-11 PROBLEM — M75.101 TEAR OF RIGHT ROTATOR CUFF: Chronic | Status: ACTIVE | Noted: 2023-11-06

## 2024-10-11 PROBLEM — H04.129 TEAR FILM INSUFFICIENCY: Status: ACTIVE | Noted: 2024-10-11

## 2024-10-11 PROBLEM — R19.7 DIARRHEA: Status: ACTIVE | Noted: 2024-10-11

## 2024-10-11 PROBLEM — D72.829 LEUKOCYTOSIS: Status: ACTIVE | Noted: 2024-10-11

## 2024-10-11 PROBLEM — E86.0 DEHYDRATION: Status: ACTIVE | Noted: 2024-10-11

## 2024-10-11 PROBLEM — K56.609 INTESTINAL OBSTRUCTION DUE TO DECREASED PERISTALSIS (MULTI): Status: ACTIVE | Noted: 2024-10-11

## 2024-10-11 PROBLEM — S37.009A INJURY OF KIDNEY: Status: ACTIVE | Noted: 2024-10-11

## 2024-10-11 NOTE — TELEPHONE ENCOUNTER
PT STATES THAT SHE GOT CALL STATING THAT SHE OWE 7033.00 SHE WOULD LIKE TO KNOW IF YOU COULD HELP HER WITH THAT AND SHE WOULD LIKE TO KNOW ABOUT HER APT IN DEC. SHE STATES THAT SHE WAS NOT AWARE OF THIS APT. I SCHEDULED HER FOR THURSDAY 10/17 FOR HER PROLIA INJECTION THAT IS IN THE [place]

## 2024-10-17 ENCOUNTER — APPOINTMENT (OUTPATIENT)
Dept: PRIMARY CARE | Facility: CLINIC | Age: 79
End: 2024-10-17
Payer: MEDICARE

## 2024-10-17 DIAGNOSIS — M81.0 AGE-RELATED OSTEOPOROSIS WITHOUT CURRENT PATHOLOGICAL FRACTURE: Primary | ICD-10-CM

## 2024-10-17 PROCEDURE — 96372 THER/PROPH/DIAG INJ SC/IM: CPT | Performed by: FAMILY MEDICINE

## 2024-10-17 PROCEDURE — 1157F ADVNC CARE PLAN IN RCRD: CPT | Performed by: FAMILY MEDICINE

## 2024-10-17 PROCEDURE — 1123F ACP DISCUSS/DSCN MKR DOCD: CPT | Performed by: FAMILY MEDICINE

## 2024-10-17 NOTE — LETTER
October 17, 2024     Patient: Rahel Cabezas   YOB: 1945   Date of Visit: 10/17/2024       To Whom It May Concern:    Rahel Cabezas was seen in my clinic on 10/17/2024 at 1:00 pm. Please excuse Rahel for her absence from work on this day to make the appointment. She was given a Prolia injection in her left upper arm in back.    If you have any questions or concerns, please don't hesitate to call.         Sincerely,         Yu Conley,         CC: No Recipients

## 2024-10-21 ENCOUNTER — SPECIALTY PHARMACY (OUTPATIENT)
Dept: PHARMACY | Facility: CLINIC | Age: 79
End: 2024-10-21

## 2024-11-19 ENCOUNTER — APPOINTMENT (OUTPATIENT)
Dept: OPHTHALMOLOGY | Facility: CLINIC | Age: 79
End: 2024-11-19
Payer: MEDICARE

## 2024-12-03 ENCOUNTER — TELEPHONE (OUTPATIENT)
Dept: PRIMARY CARE | Facility: CLINIC | Age: 79
End: 2024-12-03
Payer: MEDICARE

## 2024-12-03 NOTE — TELEPHONE ENCOUNTER
Claus from [facility] called and left a message wanting to know if you would follow and sign homecare orders for Rahel when she DC [facility] she is there due to being septic.  If so please have Brigida call him with the following information    Last Appt  Next Appt  Address here  Fax number

## 2024-12-09 ENCOUNTER — APPOINTMENT (OUTPATIENT)
Dept: PRIMARY CARE | Facility: CLINIC | Age: 79
End: 2024-12-09
Payer: MEDICARE

## 2024-12-09 ENCOUNTER — PATIENT OUTREACH (OUTPATIENT)
Dept: PRIMARY CARE | Facility: CLINIC | Age: 79
End: 2024-12-09

## 2024-12-09 NOTE — PROGRESS NOTES
Discharge Facility:[facility]  Discharge Diagnosis:Sepsis  Admission Date:11/23/24  Discharge Date:12/6/24    PCP Appointment Date:none made sent to pcp office her daughter will call to schedule future appt.  Specialist Appointment Date:   Hospital Encounter and Summary Linked: No  See discharge assessment below for further details  Engagement  Call Start Time: 0238 (12/9/2024  2:38 PM)    Medications  Medications reviewed with patient/caregiver?: Yes (spoke to her daughter) (12/9/2024  2:38 PM)  Is the patient having any side effects they believe may be caused by any medication additions or changes?: No (12/9/2024  2:38 PM)  Does the patient have all medications ordered at discharge?: Not applicable (12/9/2024  2:38 PM)  Is the patient taking all medications as directed (includes completed medication regime)?: Yes (12/9/2024  2:38 PM)    Appointments  Does the patient have a primary care provider?: Yes (12/9/2024  2:38 PM)  Care Management Interventions: Advised patient to make appointment (12/9/2024  2:38 PM)  Has the patient kept scheduled appointments due by today?: Yes (12/9/2024  2:38 PM)    Self Management  What is the home health agency?: lives in assisted living (12/9/2024  2:38 PM)  Has home health visited the patient within 72 hours of discharge?: Not applicable (12/9/2024  2:38 PM)  Has all Durable Medical Equipment (DME) been delivered?: No (12/9/2024  2:38 PM)    Patient Teaching  Does the patient have access to their discharge instructions?: Yes (12/9/2024  2:38 PM)  Care Management Interventions: Reviewed instructions with patient (12/9/2024  2:38 PM)  What is the patient's perception of their health status since discharge?: Improving (12/9/2024  2:38 PM)  Is the patient/caregiver able to teach back the hierarchy of who to call/visit for symptoms/problems? PCP, Specialist, Home Health nurse, Urgent Care, ED, 911: Yes (spoke to her daughter) (12/9/2024  2:38 PM)    Wrap Up  Wrap Up  Additional Comments: doscused discharge spoke to her daughter she syayed that she is doing much better she lives in assisted living . she will be having some physical therapy per her daughter provided contact information encouraged calll with ay questions.. (12/9/2024  2:38 PM)  Call End Time: 0243 (12/9/2024  2:38 PM)

## 2024-12-23 ENCOUNTER — PATIENT OUTREACH (OUTPATIENT)
Dept: PRIMARY CARE | Facility: CLINIC | Age: 79
End: 2024-12-23
Payer: MEDICARE

## 2024-12-23 NOTE — PROGRESS NOTES
/Discharge Facility:[facility]  Discharge Diagnosis:Delirium   Admission Date:12/18/24  Discharge Date: 12/22/24    PCP Appointment Date:none made sent to pcp office  Specialist Appointment Date:   Hospital Encounter and Summary Linked: Yes  See discharge assessment below for further details  Two attempts were made to reach patient within two business days after discharge. Voicemail left with contact information for patient to call back with any non-emergent questions or concerns.

## 2025-01-02 ENCOUNTER — PATIENT OUTREACH (OUTPATIENT)
Dept: PRIMARY CARE | Facility: CLINIC | Age: 80
End: 2025-01-02
Payer: MEDICARE

## 2025-01-03 ENCOUNTER — APPOINTMENT (OUTPATIENT)
Dept: PRIMARY CARE | Facility: CLINIC | Age: 80
End: 2025-01-03
Payer: MEDICARE

## 2025-01-03 ENCOUNTER — TELEPHONE (OUTPATIENT)
Dept: PRIMARY CARE | Facility: CLINIC | Age: 80
End: 2025-01-03

## 2025-01-03 NOTE — TELEPHONE ENCOUNTER
APPT WAS CANCEL DUE TO WEATHER/ PROVIDER WAS NOT IN. PATIENT FRIEND WAS AWARE THAT PROVIDER WILL BE LEAVING AFTER 1/10 SO APPT FOR 1/20 WAS CANCEL AND R/S TO 4/17

## 2025-01-14 ENCOUNTER — TELEPHONE (OUTPATIENT)
Dept: PRIMARY CARE | Facility: CLINIC | Age: 80
End: 2025-01-14
Payer: MEDICARE

## 2025-01-14 NOTE — TELEPHONE ENCOUNTER
Medication Name:nortriptyline (Pamelor)  Dose: 25 mg   Frequency:2 caps every morning   Quantity left: 14 caps   Pharmacy:[id number] [address] [facility]        Last appointment:  Last CPE:  Last MCW:  Next appointment:  Next CPE:  Next MCW:

## 2025-01-15 DIAGNOSIS — K58.9 IRRITABLE BOWEL SYNDROME, UNSPECIFIED TYPE: ICD-10-CM

## 2025-01-15 NOTE — TELEPHONE ENCOUNTER
Patient was wondering if she can get a short supply until  her next vst with you .       Medication Name:nortriptyline (Pamelor)  Dose: 25 mg   Frequency:2 caps every morning   Quantity left: 14 caps     Pharmacy:[id number] [address] [facility]     Last Appt: 10/17/2024  Last MCW: 09/09/2024  Next appt:  2/24/2024

## 2025-01-15 NOTE — TELEPHONE ENCOUNTER
Unable to leave a voicemail because it was fool. [place] opt was selected with our phone number included.

## 2025-01-17 RX ORDER — NORTRIPTYLINE HYDROCHLORIDE 25 MG/1
50 CAPSULE ORAL EVERY EVENING
Qty: 60 CAPSULE | Refills: 0 | Status: SHIPPED | OUTPATIENT
Start: 2025-01-17 | End: 2025-02-16

## 2025-01-20 ENCOUNTER — PATIENT OUTREACH (OUTPATIENT)
Dept: PRIMARY CARE | Facility: CLINIC | Age: 80
End: 2025-01-20

## 2025-01-20 ENCOUNTER — APPOINTMENT (OUTPATIENT)
Dept: PRIMARY CARE | Facility: CLINIC | Age: 80
End: 2025-01-20
Payer: MEDICARE

## 2025-02-06 DIAGNOSIS — K58.9 IRRITABLE BOWEL SYNDROME, UNSPECIFIED TYPE: ICD-10-CM

## 2025-02-06 RX ORDER — NORTRIPTYLINE HYDROCHLORIDE 25 MG/1
50 CAPSULE ORAL EVERY EVENING
Qty: 28 CAPSULE | Refills: 0 | Status: SHIPPED | OUTPATIENT
Start: 2025-02-06

## 2025-02-06 NOTE — TELEPHONE ENCOUNTER
Follow-up visit: 25 emi Hendricks  Last visit: 24  Depression screenin24    Will send a 14 day supply of nortriptyline 25mg - 2 tabs daily until further refills can be addressed at follow-up

## 2025-02-24 ENCOUNTER — APPOINTMENT (OUTPATIENT)
Dept: PRIMARY CARE | Facility: CLINIC | Age: 80
End: 2025-02-24
Payer: MEDICARE

## 2025-02-26 ENCOUNTER — PATIENT OUTREACH (OUTPATIENT)
Dept: PRIMARY CARE | Facility: CLINIC | Age: 80
End: 2025-02-26
Payer: MEDICARE

## 2025-02-26 NOTE — PROGRESS NOTES
Discharge Facility:[facility]  Discharge Diagnosis:Influenza A Pneumonia  Admission Date:2/23/25  Discharge Date: 2/25/25    PCP Appointment Date:3/6/25  Specialist Appointment Date:   Hospital Encounter and Summary Linked: Yes  See discharge assessment below for further details  Wrap Up  Wrap Up Additional Comments: discused discharge patient statedthat she is feeling a little better she doesnt have her meds that were prescribed at discharge she stated that she spoke to her friend and is helping her get her meds picked up from the pharmacy . provided contact information encouraged call with questions. (2/26/2025  8:48 AM)  Call End Time: 0858 (2/26/2025  8:48 AM)    Engagement  Call Start Time: 0848 (2/26/2025  8:48 AM)    Medications  Medications reviewed with patient/caregiver?: Yes (zithromax 250mg cefsdinir 300mg tamiflu) (2/26/2025  8:48 AM)  Is the patient having any side effects they believe may be caused by any medication additions or changes?: No (2/26/2025  8:48 AM)  Does the patient have all medications ordered at discharge?: No (getting someone to get them for her.) (2/26/2025  8:48 AM)  Is the patient taking all medications as directed (includes completed medication regime)?: Yes (getting today) (2/26/2025  8:48 AM)    Appointments  Does the patient have a primary care provider?: Yes (2/26/2025  8:48 AM)  Care Management Interventions: Verified appointment date/time/provider (2/26/2025  8:48 AM)  Has the patient kept scheduled appointments due by today?: Yes (2/26/2025  8:48 AM)    Self Management  What is the home health agency?: lives in assisted living (2/26/2025  8:48 AM)  Has home health visited the patient within 72 hours of discharge?: Not applicable (2/26/2025  8:48 AM)  Has all Durable Medical Equipment (DME) been delivered?: No (2/26/2025  8:48 AM)    Patient Teaching  Does the patient have access to their discharge instructions?: Yes (2/26/2025  8:48 AM)  Care Management Interventions:  Reviewed instructions with patient (2/26/2025  8:48 AM)  What is the patient's perception of their health status since discharge?: Improving (2/26/2025  8:48 AM)  Is the patient/caregiver able to teach back the hierarchy of who to call/visit for symptoms/problems? PCP, Specialist, Home Health nurse, Urgent Care, ED, 911: Yes (2/26/2025  8:48 AM)

## 2025-03-06 ENCOUNTER — APPOINTMENT (OUTPATIENT)
Dept: OPHTHALMOLOGY | Facility: CLINIC | Age: 80
End: 2025-03-06
Payer: MEDICARE

## 2025-03-10 ENCOUNTER — PATIENT OUTREACH (OUTPATIENT)
Dept: PRIMARY CARE | Facility: CLINIC | Age: 80
End: 2025-03-10
Payer: MEDICARE

## 2025-03-25 ENCOUNTER — PATIENT OUTREACH (OUTPATIENT)
Dept: PRIMARY CARE | Facility: CLINIC | Age: 80
End: 2025-03-25
Payer: MEDICARE

## 2025-04-01 DIAGNOSIS — K58.9 IRRITABLE BOWEL SYNDROME, UNSPECIFIED TYPE: ICD-10-CM

## 2025-04-01 NOTE — TELEPHONE ENCOUNTER
Patient its wondering if we can send her a short supply until she see PCP on 4/17/2025. Patient is aware that she needs to see PCP for full refill but she insist on sending a message to Dr. Hendricks.     Medication Name:nortriptyline (Pamelor)  Dose: 25 mg   Frequency:2 caps every morning   Quantity left: 7 caps   Pharmacy:[facility] [address] [place] [address]      Last appointment: 10/17/2024   Last CPE:N/A  Last MCW: 9/9/2024   Next appointment: 4/17/2025   Next CPE:N/A  Next MCW:N/A

## 2025-04-02 RX ORDER — NORTRIPTYLINE HYDROCHLORIDE 25 MG/1
50 CAPSULE ORAL EVERY EVENING
Qty: 20 CAPSULE | Refills: 0 | Status: SHIPPED | OUTPATIENT
Start: 2025-04-02

## 2025-04-03 DIAGNOSIS — K21.9 GASTRO-ESOPHAGEAL REFLUX DISEASE WITHOUT ESOPHAGITIS: ICD-10-CM

## 2025-04-04 DIAGNOSIS — I10 PRIMARY HYPERTENSION: ICD-10-CM

## 2025-04-04 RX ORDER — PANTOPRAZOLE SODIUM 40 MG/1
40 TABLET, DELAYED RELEASE ORAL
Qty: 60 TABLET | Refills: 0 | Status: SHIPPED | OUTPATIENT
Start: 2025-04-04

## 2025-04-04 NOTE — TELEPHONE ENCOUNTER
Follow-up visit: 4/17/25  Last visit: 9/6/24  Last labs: 2/25/25 (Mg 2)  Beers criteria: yes 78yo - recommend monitoring    Refill request from [facility] pharmacy for the following:   - pantoprazole 40mg daily BID  Approved: 30 days until follow-up

## 2025-04-08 RX ORDER — LISINOPRIL 5 MG/1
5 TABLET ORAL DAILY
Qty: 30 TABLET | Refills: 0 | Status: SHIPPED | OUTPATIENT
Start: 2025-04-08

## 2025-04-08 NOTE — TELEPHONE ENCOUNTER
Follow-up visit: 4/17/25  Last visit: 9/9/24 rtc in 3m  Last labs: 3/14/25 (K 4.2, Cr 0.6, eGFR 93)  Last vitals: 9/9/24 (/86, )    Refill request from [facility] pharmacy for the following:   - lisinopril 5mg daily  Approved: 30 days until follow-up

## 2025-04-17 ENCOUNTER — APPOINTMENT (OUTPATIENT)
Dept: PRIMARY CARE | Facility: CLINIC | Age: 80
End: 2025-04-17
Payer: MEDICARE

## 2025-04-17 VITALS
TEMPERATURE: 98.2 F | BODY MASS INDEX: 26.32 KG/M2 | OXYGEN SATURATION: 95 % | WEIGHT: 122 LBS | HEART RATE: 100 BPM | HEIGHT: 57 IN | DIASTOLIC BLOOD PRESSURE: 81 MMHG | SYSTOLIC BLOOD PRESSURE: 135 MMHG

## 2025-04-17 DIAGNOSIS — I10 PRIMARY HYPERTENSION: ICD-10-CM

## 2025-04-17 DIAGNOSIS — M81.0 AGE-RELATED OSTEOPOROSIS WITHOUT CURRENT PATHOLOGICAL FRACTURE: ICD-10-CM

## 2025-04-17 DIAGNOSIS — Z12.31 ENCOUNTER FOR SCREENING MAMMOGRAM FOR MALIGNANT NEOPLASM OF BREAST: ICD-10-CM

## 2025-04-17 DIAGNOSIS — K58.9 IRRITABLE BOWEL SYNDROME, UNSPECIFIED TYPE: ICD-10-CM

## 2025-04-17 DIAGNOSIS — K21.9 GASTRO-ESOPHAGEAL REFLUX DISEASE WITHOUT ESOPHAGITIS: ICD-10-CM

## 2025-04-17 DIAGNOSIS — G62.9 POLYNEUROPATHY, UNSPECIFIED: ICD-10-CM

## 2025-04-17 PROBLEM — J10.1 INFLUENZA A: Status: ACTIVE | Noted: 2025-02-23

## 2025-04-17 PROBLEM — J18.9 PNEUMONIA OF RIGHT LOWER LOBE DUE TO INFECTIOUS ORGANISM: Status: ACTIVE | Noted: 2025-02-24

## 2025-04-17 PROBLEM — Z93.1 GASTROSTOMY STATUS (MULTI): Status: RESOLVED | Noted: 2022-09-21 | Resolved: 2025-04-17

## 2025-04-17 PROCEDURE — 1036F TOBACCO NON-USER: CPT | Performed by: FAMILY MEDICINE

## 2025-04-17 PROCEDURE — 1160F RVW MEDS BY RX/DR IN RCRD: CPT | Performed by: FAMILY MEDICINE

## 2025-04-17 PROCEDURE — 99214 OFFICE O/P EST MOD 30 MIN: CPT | Performed by: FAMILY MEDICINE

## 2025-04-17 PROCEDURE — G2211 COMPLEX E/M VISIT ADD ON: HCPCS | Performed by: FAMILY MEDICINE

## 2025-04-17 PROCEDURE — 3075F SYST BP GE 130 - 139MM HG: CPT | Performed by: FAMILY MEDICINE

## 2025-04-17 PROCEDURE — 1123F ACP DISCUSS/DSCN MKR DOCD: CPT | Performed by: FAMILY MEDICINE

## 2025-04-17 PROCEDURE — 1158F ADVNC CARE PLAN TLK DOCD: CPT | Performed by: FAMILY MEDICINE

## 2025-04-17 PROCEDURE — 1157F ADVNC CARE PLAN IN RCRD: CPT | Performed by: FAMILY MEDICINE

## 2025-04-17 PROCEDURE — 1159F MED LIST DOCD IN RCRD: CPT | Performed by: FAMILY MEDICINE

## 2025-04-17 PROCEDURE — 3079F DIAST BP 80-89 MM HG: CPT | Performed by: FAMILY MEDICINE

## 2025-04-17 RX ORDER — PANTOPRAZOLE SODIUM 40 MG/1
40 TABLET, DELAYED RELEASE ORAL
Qty: 180 TABLET | Refills: 1 | Status: SHIPPED | OUTPATIENT
Start: 2025-04-17

## 2025-04-17 RX ORDER — NORTRIPTYLINE HYDROCHLORIDE 25 MG/1
50 CAPSULE ORAL EVERY EVENING
Qty: 180 CAPSULE | Refills: 1 | Status: SHIPPED | OUTPATIENT
Start: 2025-04-17

## 2025-04-17 RX ORDER — LISINOPRIL 5 MG/1
5 TABLET ORAL DAILY
Qty: 90 TABLET | Refills: 1 | Status: SHIPPED | OUTPATIENT
Start: 2025-04-17

## 2025-04-17 RX ORDER — GABAPENTIN 600 MG/1
600 TABLET ORAL 3 TIMES DAILY
Qty: 270 TABLET | Refills: 0 | Status: CANCELLED | OUTPATIENT
Start: 2025-04-17 | End: 2026-04-17

## 2025-04-17 NOTE — PROGRESS NOTES
"Subjective   Patient ID: Rahel Cabezas is a 79 y.o. female who presents for Follow-up.    HPI   Patient is in the office today for follow-up.     She has personal history of spinal stenosis which is treated with gabapentin with no side effects. She needs a refill on it. She is over due for UDS/CSA drug test.     She has anxiety/ depression which is treated with nortriptyline with no side effects. She needs a refill on it.     She reports that she has been having recurrent and frequent UTI's, and because of that she is having suprapubic catheter placed to see if it helps her with the recurrent UTI's.     She has personal history of osteoporosis and is concerned about her prolia injection because she believes that she is over due for it, her last one was 10/2024.    She has hypertension which is well controlled with lisinopril 5 mg once a day, today at the office his BP was 135/81. She denies any side effects with medication. She denies SOB or chest pain.     She has GERD which is well controlled with pantoprazole 40 mg twice a day with no side effects with the medication.     She is over due for screening mammogram.     Review of Systems    Objective   /81   Pulse 100   Temp 36.8 °C (98.2 °F)   Ht 1.448 m (4' 9\")   Wt 55.3 kg (122 lb)   LMP  (LMP Unknown)   SpO2 95%   BMI 26.40 kg/m²     Physical Exam    Assessment/Plan   Diagnoses and all orders for this visit:  Encounter for screening mammogram for malignant neoplasm of breast  -     BI mammo bilateral screening tomosynthesis; Future  Polyneuropathy, unspecified  Irritable bowel syndrome, unspecified type  -     nortriptyline (Pamelor) 25 mg capsule; Take 2 capsules (50 mg) by mouth once daily in the evening.  Primary hypertension  -     lisinopril 5 mg tablet; Take 1 tablet (5 mg) by mouth once daily.  Gastro-esophageal reflux disease without esophagitis  -     pantoprazole (ProtoNix) 40 mg EC tablet; Take 1 tablet (40 mg) by mouth 2 times a day " before meals.    Recommended drug screen test. She will schedule it for next week, has to do cath for urine.   Medications refilled today. Instructed take medications as prescribed.   Setting up prolia injection  Follow-up in 3 months, call us if needed sooner.     Scribe Attestation  By signing my name below, IFawad Scribe   attest that this documentation has been prepared under the direction and in the presence of Yu Conley DO.

## 2025-04-18 ENCOUNTER — SPECIALTY PHARMACY (OUTPATIENT)
Dept: PHARMACY | Facility: CLINIC | Age: 80
End: 2025-04-18

## 2025-04-22 PROCEDURE — RXMED WILLOW AMBULATORY MEDICATION CHARGE

## 2025-04-23 ENCOUNTER — APPOINTMENT (OUTPATIENT)
Dept: PRIMARY CARE | Facility: CLINIC | Age: 80
End: 2025-04-23
Payer: MEDICARE

## 2025-04-23 ENCOUNTER — PHARMACY VISIT (OUTPATIENT)
Dept: PHARMACY | Facility: CLINIC | Age: 80
End: 2025-04-23
Payer: COMMERCIAL

## 2025-04-23 DIAGNOSIS — Z79.899 HIGH RISK MEDICATION USE: ICD-10-CM

## 2025-04-23 DIAGNOSIS — R30.0 BURNING WITH URINATION: ICD-10-CM

## 2025-04-23 LAB
POC APPEARANCE, URINE: CLEAR
POC BILIRUBIN, URINE: NEGATIVE
POC BLOOD, URINE: ABNORMAL
POC COLOR, URINE: YELLOW
POC GLUCOSE, URINE: NEGATIVE MG/DL
POC KETONES, URINE: NEGATIVE MG/DL
POC LEUKOCYTES, URINE: ABNORMAL
POC NITRITE,URINE: NEGATIVE
POC PH, URINE: 7 PH
POC PROTEIN, URINE: NEGATIVE MG/DL
POC SPECIFIC GRAVITY, URINE: 1.01
POC UROBILINOGEN, URINE: 0.2 EU/DL

## 2025-04-23 PROCEDURE — 81003 URINALYSIS AUTO W/O SCOPE: CPT | Performed by: FAMILY MEDICINE

## 2025-04-23 PROCEDURE — 1123F ACP DISCUSS/DSCN MKR DOCD: CPT | Performed by: FAMILY MEDICINE

## 2025-04-23 PROCEDURE — 1159F MED LIST DOCD IN RCRD: CPT | Performed by: FAMILY MEDICINE

## 2025-04-23 PROCEDURE — 1036F TOBACCO NON-USER: CPT | Performed by: FAMILY MEDICINE

## 2025-04-23 PROCEDURE — 1158F ADVNC CARE PLAN TLK DOCD: CPT | Performed by: FAMILY MEDICINE

## 2025-04-23 PROCEDURE — 1157F ADVNC CARE PLAN IN RCRD: CPT | Performed by: FAMILY MEDICINE

## 2025-04-23 NOTE — PROGRESS NOTES
PT CAME IN TODAY FOR A UDS. SHE ASKED THAT HER URINE BE CHECKED FOR A UTI BECAUSE SHE WAS EXPERIENCING A BURNING SENSATION. DUE TO HER ABNORMAL UA THE URINE WAS SENT OUT FOR A CULTURE      Reported to pt abnormal urine and sent for culture.  Advise to discuss w her urologist also

## 2025-04-25 ENCOUNTER — TELEPHONE (OUTPATIENT)
Dept: PRIMARY CARE | Facility: CLINIC | Age: 80
End: 2025-04-25
Payer: MEDICARE

## 2025-04-25 LAB
DRUG SCREEN COMMENT UR-IMP: ABNORMAL
GABAPENTIN UR-MCNC: ABNORMAL NG/ML
QUEST NOTES AND COMMENTS: ABNORMAL

## 2025-04-26 ENCOUNTER — TELEPHONE (OUTPATIENT)
Dept: PRIMARY CARE | Facility: CLINIC | Age: 80
End: 2025-04-26
Payer: MEDICARE

## 2025-04-26 DIAGNOSIS — N39.0 URINARY TRACT INFECTION WITHOUT HEMATURIA, SITE UNSPECIFIED: Primary | ICD-10-CM

## 2025-04-26 LAB — BACTERIA UR CULT: ABNORMAL

## 2025-04-26 RX ORDER — SULFAMETHOXAZOLE AND TRIMETHOPRIM 800; 160 MG/1; MG/1
1 TABLET ORAL 2 TIMES DAILY
Qty: 14 TABLET | Refills: 0 | Status: SHIPPED | OUTPATIENT
Start: 2025-04-26 | End: 2025-05-03

## 2025-04-26 NOTE — TELEPHONE ENCOUNTER
On call note:  Patient called to inquire about urine cx results.  Has dysuria.  Declines fever, chills, nausea, vomiting.  Chart indicates h/o frequent UTIs w/sepsis.  Bactrim sent to the pharmacy per cx results.  
LIONEL/DENA/Meenu

## 2025-04-29 ENCOUNTER — APPOINTMENT (OUTPATIENT)
Dept: PRIMARY CARE | Facility: CLINIC | Age: 80
End: 2025-04-29
Payer: MEDICARE

## 2025-05-02 ENCOUNTER — CLINICAL SUPPORT (OUTPATIENT)
Dept: PRIMARY CARE | Facility: CLINIC | Age: 80
End: 2025-05-02
Payer: MEDICARE

## 2025-05-02 DIAGNOSIS — M81.0 AGE-RELATED OSTEOPOROSIS WITHOUT CURRENT PATHOLOGICAL FRACTURE: ICD-10-CM

## 2025-05-02 DIAGNOSIS — Q67.5 CONGENITAL SCOLIOSIS: ICD-10-CM

## 2025-05-02 PROCEDURE — 96372 THER/PROPH/DIAG INJ SC/IM: CPT | Performed by: FAMILY MEDICINE

## 2025-05-05 DIAGNOSIS — G62.9 POLYNEUROPATHY, UNSPECIFIED: ICD-10-CM

## 2025-05-05 RX ORDER — GABAPENTIN 600 MG/1
600 TABLET ORAL 3 TIMES DAILY
Qty: 270 TABLET | Refills: 0 | Status: SHIPPED | OUTPATIENT
Start: 2025-05-05 | End: 2025-08-03

## 2025-05-16 ENCOUNTER — APPOINTMENT (OUTPATIENT)
Dept: RADIOLOGY | Facility: HOSPITAL | Age: 80
End: 2025-05-16
Payer: MEDICARE

## 2025-05-16 ENCOUNTER — HOSPITAL ENCOUNTER (EMERGENCY)
Facility: HOSPITAL | Age: 80
Discharge: HOME | End: 2025-05-16
Payer: MEDICARE

## 2025-05-16 VITALS
TEMPERATURE: 96.9 F | WEIGHT: 120 LBS | RESPIRATION RATE: 16 BRPM | OXYGEN SATURATION: 97 % | SYSTOLIC BLOOD PRESSURE: 150 MMHG | DIASTOLIC BLOOD PRESSURE: 70 MMHG | HEIGHT: 57 IN | BODY MASS INDEX: 25.89 KG/M2 | HEART RATE: 98 BPM

## 2025-05-16 DIAGNOSIS — S90.32XA CONTUSION OF LEFT FOOT, INITIAL ENCOUNTER: Primary | ICD-10-CM

## 2025-05-16 PROCEDURE — 73630 X-RAY EXAM OF FOOT: CPT | Mod: LT

## 2025-05-16 PROCEDURE — 73630 X-RAY EXAM OF FOOT: CPT | Mod: LEFT SIDE | Performed by: RADIOLOGY

## 2025-05-16 PROCEDURE — 99284 EMERGENCY DEPT VISIT MOD MDM: CPT

## 2025-05-16 PROCEDURE — 73610 X-RAY EXAM OF ANKLE: CPT | Mod: LEFT SIDE | Performed by: RADIOLOGY

## 2025-05-16 PROCEDURE — 73610 X-RAY EXAM OF ANKLE: CPT | Mod: LT

## 2025-05-16 ASSESSMENT — PAIN - FUNCTIONAL ASSESSMENT: PAIN_FUNCTIONAL_ASSESSMENT: 0-10

## 2025-05-16 ASSESSMENT — COLUMBIA-SUICIDE SEVERITY RATING SCALE - C-SSRS
6. HAVE YOU EVER DONE ANYTHING, STARTED TO DO ANYTHING, OR PREPARED TO DO ANYTHING TO END YOUR LIFE?: NO
1. IN THE PAST MONTH, HAVE YOU WISHED YOU WERE DEAD OR WISHED YOU COULD GO TO SLEEP AND NOT WAKE UP?: NO
2. HAVE YOU ACTUALLY HAD ANY THOUGHTS OF KILLING YOURSELF?: NO

## 2025-05-16 ASSESSMENT — PAIN SCALES - GENERAL: PAINLEVEL_OUTOF10: 0 - NO PAIN
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
## 2025-05-23 ENCOUNTER — PATIENT OUTREACH (OUTPATIENT)
Dept: PRIMARY CARE | Facility: CLINIC | Age: 80
End: 2025-05-23
Payer: MEDICARE

## 2025-05-29 ENCOUNTER — HOSPITAL ENCOUNTER (OUTPATIENT)
Dept: RADIOLOGY | Facility: CLINIC | Age: 80
Discharge: HOME | End: 2025-05-29
Payer: MEDICARE

## 2025-05-29 VITALS — WEIGHT: 120 LBS | BODY MASS INDEX: 25.97 KG/M2

## 2025-05-29 DIAGNOSIS — Z12.31 ENCOUNTER FOR SCREENING MAMMOGRAM FOR MALIGNANT NEOPLASM OF BREAST: ICD-10-CM

## 2025-05-29 PROCEDURE — 77067 SCR MAMMO BI INCL CAD: CPT | Performed by: RADIOLOGY

## 2025-05-29 PROCEDURE — 77063 BREAST TOMOSYNTHESIS BI: CPT | Performed by: RADIOLOGY

## 2025-05-29 PROCEDURE — 77063 BREAST TOMOSYNTHESIS BI: CPT

## 2025-06-27 ENCOUNTER — TELEPHONE (OUTPATIENT)
Dept: PRIMARY CARE | Facility: CLINIC | Age: 80
End: 2025-06-27
Payer: MEDICARE

## 2025-07-03 ENCOUNTER — APPOINTMENT (OUTPATIENT)
Dept: OPHTHALMOLOGY | Facility: CLINIC | Age: 80
End: 2025-07-03
Payer: MEDICARE

## 2025-07-11 ENCOUNTER — PATIENT OUTREACH (OUTPATIENT)
Dept: PRIMARY CARE | Facility: CLINIC | Age: 80
End: 2025-07-11
Payer: MEDICARE

## 2025-07-11 NOTE — PROGRESS NOTES
Discharge Facility: [facility]   Discharge Diagnosis: C-diff infection, Hypotension  Admission Date: 7/7/2025  Discharge Date: 7/10/2025    PCP Appointment Date: Appointment with Yu Conley DO (07/17/2025)   Specialist Appointment Date: Urology in 2 weeks  Hospital Encounter and Summary Linked: Yes  Hospital Encounter with Cindy De Oliveira MD; Shayne Black DO; Hermelindo Mota MD      See discharge assessment below for further details:     Wrap Up  Wrap Up Additional Comments: 72yoF presented for hypotension. Systolic BP in ED in the 80's. Also had c/o diarrhea. She had previously had suprapubic catheter placement 6/26. BP improved with fluids. After work-up patient found to be positive for C-diff and treated with vancomycin. Patient discharged to home once stabilized with [facility] SN/PT. Follow up needed for elevated liver enzymes (trending downward at discharge), horseshoe kidney, and SPC placement. At time of outreach, the patient stated she feels improved. Appetite has been good. Eating 3 meals a day. No further diarrhea. She is taking her vancomycin as prescribed. No issues or concerns with SPC. Urology follow up to be scheduled in 2 weeks. (7/14/2025  9:43 AM)    Medications  Medications reviewed with patient/caregiver?: Yes (7/14/2025  9:43 AM)  Is the patient having any side effects they believe may be caused by any medication additions or changes?: No (7/14/2025  9:43 AM)  Does the patient have all medications ordered at discharge?: Yes (7/14/2025  9:43 AM)  Care Management Interventions: No intervention needed (7/14/2025  9:43 AM)  Prescription Comments: NEW: vancomycin (7/14/2025  9:43 AM)  Is the patient taking all medications as directed (includes completed medication regime)?: Yes (7/14/2025  9:43 AM)  Care Management Interventions: Provided patient education (7/14/2025  9:43 AM)  Medication Comments: Verbalized understanding of medication changes. (7/14/2025  9:43  AM)    Appointments  Does the patient have a primary care provider?: Yes (7/14/2025  9:43 AM)  Care Management Interventions: Verified appointment date/time/provider (7/14/2025  9:43 AM)  Has the patient kept scheduled appointments due by today?: Not applicable (7/14/2025  9:43 AM)  Care Management Interventions: Advised to schedule with specialist (Urology) (7/14/2025  9:43 AM)    Self Management  What is the home health agency?: [facility] (7/14/2025  9:43 AM)  What Durable Medical Equipment (DME) was ordered?: N/A (7/14/2025  9:43 AM)    Patient Teaching  Does the patient have access to their discharge instructions?: Yes (7/14/2025  9:43 AM)  Care Management Interventions: Reviewed instructions with patient (7/14/2025  9:43 AM)  What is the patient's perception of their health status since discharge?: Improving (7/14/2025  9:43 AM)  Is the patient/caregiver able to teach back the hierarchy of who to call/visit for symptoms/problems? PCP, Specialist, Home Health nurse, Urgent Care, ED, 911: Yes (7/14/2025  9:43 AM)  Patient/Caregiver Education Comments: Patient verbalized understanding of discharge instructions. Provided contact information for nonurgent questions or concerns. (7/14/2025  9:43 AM)

## 2025-07-14 RX ORDER — VANCOMYCIN HYDROCHLORIDE 125 MG/1
CAPSULE ORAL
COMMUNITY
Start: 2025-07-10 | End: 2025-08-19

## 2025-07-14 RX ORDER — HYDROCODONE BITARTRATE AND ACETAMINOPHEN 5; 325 MG/1; MG/1
1 TABLET ORAL 3 TIMES DAILY PRN
COMMUNITY
Start: 2025-06-26 | End: 2025-07-17 | Stop reason: ALTCHOICE

## 2025-07-17 ENCOUNTER — APPOINTMENT (OUTPATIENT)
Dept: PRIMARY CARE | Facility: CLINIC | Age: 80
End: 2025-07-17
Payer: MEDICARE

## 2025-07-17 VITALS
SYSTOLIC BLOOD PRESSURE: 134 MMHG | OXYGEN SATURATION: 94 % | HEART RATE: 102 BPM | WEIGHT: 122 LBS | TEMPERATURE: 98 F | BODY MASS INDEX: 26.32 KG/M2 | HEIGHT: 57 IN | DIASTOLIC BLOOD PRESSURE: 78 MMHG

## 2025-07-17 DIAGNOSIS — Z00.00 ROUTINE GENERAL MEDICAL EXAMINATION AT HEALTH CARE FACILITY: ICD-10-CM

## 2025-07-17 DIAGNOSIS — R79.89 ABNORMAL LFTS: ICD-10-CM

## 2025-07-17 DIAGNOSIS — K58.9 IRRITABLE BOWEL SYNDROME, UNSPECIFIED TYPE: ICD-10-CM

## 2025-07-17 DIAGNOSIS — M81.0 OSTEOPOROSIS WITHOUT CURRENT PATHOLOGICAL FRACTURE, UNSPECIFIED OSTEOPOROSIS TYPE: ICD-10-CM

## 2025-07-17 DIAGNOSIS — E78.5 HYPERLIPIDEMIA, UNSPECIFIED HYPERLIPIDEMIA TYPE: ICD-10-CM

## 2025-07-17 DIAGNOSIS — G62.9 POLYNEUROPATHY, UNSPECIFIED: ICD-10-CM

## 2025-07-17 DIAGNOSIS — Z00.00 MEDICARE ANNUAL WELLNESS VISIT, SUBSEQUENT: Primary | ICD-10-CM

## 2025-07-17 DIAGNOSIS — I10 PRIMARY HYPERTENSION: ICD-10-CM

## 2025-07-17 PROCEDURE — 1159F MED LIST DOCD IN RCRD: CPT | Performed by: FAMILY MEDICINE

## 2025-07-17 PROCEDURE — 3075F SYST BP GE 130 - 139MM HG: CPT | Performed by: FAMILY MEDICINE

## 2025-07-17 PROCEDURE — G0439 PPPS, SUBSEQ VISIT: HCPCS | Performed by: FAMILY MEDICINE

## 2025-07-17 PROCEDURE — 1160F RVW MEDS BY RX/DR IN RCRD: CPT | Performed by: FAMILY MEDICINE

## 2025-07-17 PROCEDURE — 3078F DIAST BP <80 MM HG: CPT | Performed by: FAMILY MEDICINE

## 2025-07-17 PROCEDURE — 99214 OFFICE O/P EST MOD 30 MIN: CPT | Performed by: FAMILY MEDICINE

## 2025-07-17 PROCEDURE — 99397 PER PM REEVAL EST PAT 65+ YR: CPT | Performed by: FAMILY MEDICINE

## 2025-07-17 PROCEDURE — 1126F AMNT PAIN NOTED NONE PRSNT: CPT | Performed by: FAMILY MEDICINE

## 2025-07-17 PROCEDURE — 1036F TOBACCO NON-USER: CPT | Performed by: FAMILY MEDICINE

## 2025-07-17 PROCEDURE — 1170F FXNL STATUS ASSESSED: CPT | Performed by: FAMILY MEDICINE

## 2025-07-17 RX ORDER — NORTRIPTYLINE HYDROCHLORIDE 25 MG/1
50 CAPSULE ORAL EVERY EVENING
Qty: 180 CAPSULE | Refills: 0 | Status: SHIPPED | OUTPATIENT
Start: 2025-07-17

## 2025-07-17 RX ORDER — GABAPENTIN 600 MG/1
600 TABLET ORAL 3 TIMES DAILY
Qty: 270 TABLET | Refills: 0 | Status: SHIPPED | OUTPATIENT
Start: 2025-07-17 | End: 2025-10-15

## 2025-07-17 RX ORDER — LISINOPRIL 5 MG/1
5 TABLET ORAL DAILY
Qty: 90 TABLET | Refills: 0 | Status: SHIPPED | OUTPATIENT
Start: 2025-07-17

## 2025-07-17 ASSESSMENT — ACTIVITIES OF DAILY LIVING (ADL)
MANAGING_FINANCES: INDEPENDENT
GROCERY_SHOPPING: INDEPENDENT
DOING_HOUSEWORK: INDEPENDENT
DRESSING: INDEPENDENT
TAKING_MEDICATION: INDEPENDENT
BATHING: INDEPENDENT

## 2025-07-17 ASSESSMENT — ANXIETY QUESTIONNAIRES
IF YOU CHECKED OFF ANY PROBLEMS ON THIS QUESTIONNAIRE, HOW DIFFICULT HAVE THESE PROBLEMS MADE IT FOR YOU TO DO YOUR WORK, TAKE CARE OF THINGS AT HOME, OR GET ALONG WITH OTHER PEOPLE: NOT DIFFICULT AT ALL
4. TROUBLE RELAXING: NOT AT ALL
6. BECOMING EASILY ANNOYED OR IRRITABLE: NOT AT ALL
3. WORRYING TOO MUCH ABOUT DIFFERENT THINGS: NOT AT ALL
2. NOT BEING ABLE TO STOP OR CONTROL WORRYING: NOT AT ALL
5. BEING SO RESTLESS THAT IT IS HARD TO SIT STILL: NOT AT ALL
1. FEELING NERVOUS, ANXIOUS, OR ON EDGE: NOT AT ALL
7. FEELING AFRAID AS IF SOMETHING AWFUL MIGHT HAPPEN: NOT AT ALL
GAD7 TOTAL SCORE: 0

## 2025-07-17 ASSESSMENT — ENCOUNTER SYMPTOMS
LOSS OF SENSATION IN FEET: 0
DEPRESSION: 0
OCCASIONAL FEELINGS OF UNSTEADINESS: 0

## 2025-07-17 ASSESSMENT — PAIN SCALES - GENERAL: PAINLEVEL_OUTOF10: 0-NO PAIN

## 2025-07-17 NOTE — PROGRESS NOTES
Subjective   Reason for Visit: Rahel Cabezas is an 80 y.o. female here for a Medicare Wellness visit.     Past Medical, Surgical, and Family History reviewed and updated in chart.    Reviewed all medications by prescribing practitioner or clinical pharmacist (such as prescriptions, OTCs, herbal therapies and supplements) and documented in the medical record.  Medicare Wellness Billing Compliance Satisfied    *This is a visual tool to show completion of required items on the day of the visit. Green checks will only appear on the date of visit.    Review all medications by prescribing practitioner or clinical pharmacist (such as prescriptions, OTCs, herbal therapies and supplements) documented in the medical record    Past Medical, Surgical, and Family History reviewed and updated in chart    Tobacco Use Reviewed    Alcohol Use Reviewed    Illicit Drug Use Reviewed    PHQ2/9    Falls in Last Year Reviewed    Home Safety Risk Factors Reviewed    Cognitive Impairment Reviewed    Patient Self Assessment and Health Status    Current Diet Reviewed    Exercise Frequency    ADL - Hearing Impairment    ADL - Bathing    ADL - Dressing    ADL - Walks in Home    IADL - Managing Finances    IADL - Grocery Shopping    IADL - Taking Medications    IADL - Doing Housework      HPI  Patient is in the office today for her Medicare Annual Wellness.    She presented to the hospital on 7/7/2025 for hypotension. She had previously had SPC placement 6/26. She noticed weakness at home the day of admission so came to the ED and was found to be hypotensive to the 80s systolic. She was given fluids which improved her BP. The patient underwent CT abdomen and pelvis which was read by radiologist as showing non specific coloproctitis with no organized fluid collection, she has also been having diarrhea,  She underwent cdiff testing and was positive on PCR and EIA so she was started on PO vancomycin. She also had mildly  "elevated LFTs and WBC to 37 which resolved with treatment of the C diff. Urology reviewed her chart and noted that she could start gentamicin as an OP and no need to remove SPC sutures at this time but can be done as outpatient. On July 9, 2025 her hypotension had improved. Her LFTs also were down trending and The patient underwent RUQUS which was read by radiologist as showing unremarkable appearance of the liver. On July 10, 2025 her LFTs were down trending and she was feeling much better.    Her BP was within good range (134/78) when checked today at the office. She is using lisinopril 5 mg daily. She denies any side effects to the medication. She denies SOB or chest pain.     She has personal history of spinal stenosis which is treated with gabapentin with no side effects. Her last drug screen was in April 2025.    She has anxiety/ depression which is treated with nortriptyline with no side effects. She needs a refill on it.     She has GERD which is well controlled with pantoprazole 40 mg twice a day with no side effects with the medication.     She is using Prolia injections every 6 months to treat her osteoporosis. She denies any side effects to the medication. Last injection in May.  She is due for DEXA scan in August, she states would like to schedule for Dec    She has not done her colonoscopy due in Aug 2024, she states will do when has her other medical problems under control        Patient Care Team:  Yu Conley DO as PCP - General (Family Medicine)  Yu Conley DO as PCP - [employer] PCP  Radha Meade RN as Care Manager (Case Management)     Review of Systems    Objective   Vitals:  /78 (BP Location: Left arm, Patient Position: Sitting, BP Cuff Size: Adult)   Pulse 102   Temp 36.7 °C (98 °F) (Temporal)   Ht (!) 1.448 m (4' 9\")   Wt 55.3 kg (122 lb)   LMP  (LMP Unknown)   SpO2 94%   BMI 26.40 kg/m²       Physical Exam    Assessment & " Plan  Medicare annual wellness visit, subsequent  Recommended schedule DEXA bone density scan.   Medications refilled today. Instructed to take medications as prescribed. CSA reviewed and signed today.   OARRS reviewed, diversion addiction and efficacy of medication considered along with risks and benefits and will continue patient on medication.  She will try and do her colonoscopy in the fall of this year  Reviewed labs from hospitalization, her 37,000 wbc down to 11.  LFT's improved but not normalized, will recheck  Follow-up in 3 months, call us if needed sooner.       Routine general medical examination at health care facility         Polyneuropathy, unspecified    Orders:    gabapentin (Neurontin) 600 mg tablet; Take 1 tablet (600 mg) by mouth 3 times a day.    Primary hypertension    Orders:    lisinopril 5 mg tablet; Take 1 tablet (5 mg) by mouth once daily.    Irritable bowel syndrome, unspecified type    Orders:    nortriptyline (Pamelor) 25 mg capsule; Take 2 capsules (50 mg) by mouth once daily in the evening.    Osteoporosis without current pathological fracture, unspecified osteoporosis type    Orders:    XR DEXA bone density; Future    Hyperlipidemia, unspecified hyperlipidemia type         Abnormal LFTs    Orders:    Comprehensive Metabolic Panel; Future    Recommended schedule DEXA bone density scan.   Medications refilled today. Instructed to take medications as prescribed. CSA reviewed and signed today.   OARRS reviewed, diversion addiction and efficacy of medication considered along with risks and benefits and will continue patient on medication.  Follow-up in 3 months, call us if needed sooner.        Scribe Attestation  By signing my name below, IFawad Scribe   attest that this documentation has been prepared under the direction and in the presence of Yu Conley DO.

## 2025-07-17 NOTE — ASSESSMENT & PLAN NOTE
Orders:    gabapentin (Neurontin) 600 mg tablet; Take 1 tablet (600 mg) by mouth 3 times a day.

## 2025-07-29 ENCOUNTER — PATIENT OUTREACH (OUTPATIENT)
Dept: PRIMARY CARE | Facility: CLINIC | Age: 80
End: 2025-07-29
Payer: MEDICARE

## 2025-07-29 NOTE — PROGRESS NOTES
Confirmation of at least 2 patient identifiers.    Completed telephonic follow-up with patient after recent visit with Dr. Velasquez.   Spoke to patient during outreach call.    Patient reports feeling: Improved. She is still having occasional bouts of diarrhea and is taking imodium with relief. She is also taking her vancomycin as directed. It has been decreased to once daily.     Patient has questions or concerns about medications: No    Have all prescribed medications been filled? Yes    Patient has necessary resources to manage their care? Yes    Patient has questions or concerns? No.    Next care management follow-up approximately within one month.  Care  information provided to patient.

## 2025-10-17 ENCOUNTER — APPOINTMENT (OUTPATIENT)
Dept: PRIMARY CARE | Facility: CLINIC | Age: 80
End: 2025-10-17
Payer: MEDICARE

## 2025-10-24 ENCOUNTER — APPOINTMENT (OUTPATIENT)
Dept: PRIMARY CARE | Facility: CLINIC | Age: 80
End: 2025-10-24
Payer: MEDICARE

## 2025-10-30 ENCOUNTER — APPOINTMENT (OUTPATIENT)
Dept: OPHTHALMOLOGY | Facility: CLINIC | Age: 80
End: 2025-10-30
Payer: MEDICARE

## 2026-01-16 ENCOUNTER — APPOINTMENT (OUTPATIENT)
Dept: PRIMARY CARE | Facility: CLINIC | Age: 81
End: 2026-01-16
Payer: MEDICARE

## 2026-04-16 ENCOUNTER — APPOINTMENT (OUTPATIENT)
Dept: PRIMARY CARE | Facility: CLINIC | Age: 81
End: 2026-04-16
Payer: MEDICARE